# Patient Record
Sex: FEMALE | Race: WHITE | NOT HISPANIC OR LATINO | Employment: UNEMPLOYED | URBAN - METROPOLITAN AREA
[De-identification: names, ages, dates, MRNs, and addresses within clinical notes are randomized per-mention and may not be internally consistent; named-entity substitution may affect disease eponyms.]

---

## 2018-03-07 ENCOUNTER — HOSPITAL ENCOUNTER (OUTPATIENT)
Dept: PAIN MANAGEMENT | Age: 48
Discharge: HOME | End: 2018-03-07
Attending: PAIN MEDICINE

## 2018-03-07 VITALS
BODY MASS INDEX: 33.82 KG/M2 | RESPIRATION RATE: 16 BRPM | WEIGHT: 203 LBS | HEART RATE: 67 BPM | OXYGEN SATURATION: 98 % | HEIGHT: 65 IN | DIASTOLIC BLOOD PRESSURE: 86 MMHG | TEMPERATURE: 98.5 F | SYSTOLIC BLOOD PRESSURE: 147 MMHG

## 2018-03-07 DIAGNOSIS — M47.817 LUMBOSACRAL SPONDYLOSIS WITHOUT MYELOPATHY: Primary | ICD-10-CM

## 2018-03-07 PROCEDURE — 99202 OFFICE O/P NEW SF 15 MIN: CPT

## 2018-03-07 PROCEDURE — 99243 OFF/OP CNSLTJ NEW/EST LOW 30: CPT | Performed by: PAIN MEDICINE

## 2018-03-07 RX ORDER — TIZANIDINE 4 MG/1
4 TABLET ORAL 2 TIMES DAILY PRN
Qty: 60 TABLET | Refills: 3 | Status: SHIPPED | OUTPATIENT
Start: 2018-03-07 | End: 2018-06-12 | Stop reason: SINTOL

## 2018-03-07 RX ORDER — TRAMADOL HYDROCHLORIDE 50 MG/1
100 TABLET ORAL 2 TIMES DAILY
COMMUNITY
End: 2018-06-12 | Stop reason: ALTCHOICE

## 2018-03-07 ASSESSMENT — PAIN SCALES - GENERAL
PAIN_LEVEL_WITH_ACTIVITY: 9
PAIN_LEVEL_AT_REST: 5

## 2018-03-08 DIAGNOSIS — M47.817 LUMBOSACRAL SPONDYLOSIS WITHOUT MYELOPATHY: Primary | ICD-10-CM

## 2018-03-20 ENCOUNTER — APPOINTMENT (OUTPATIENT)
Dept: MRI IMAGING | Age: 48
End: 2018-03-20
Attending: PAIN MEDICINE

## 2018-04-17 ENCOUNTER — APPOINTMENT (OUTPATIENT)
Dept: MRI IMAGING | Age: 48
End: 2018-04-17
Attending: PAIN MEDICINE

## 2018-04-18 ENCOUNTER — TELEPHONE (OUTPATIENT)
Dept: PAIN MANAGEMENT | Age: 48
End: 2018-04-18

## 2018-05-07 ENCOUNTER — APPOINTMENT (OUTPATIENT)
Dept: MRI IMAGING | Age: 48
End: 2018-05-07
Attending: PAIN MEDICINE

## 2018-05-22 ENCOUNTER — TELEPHONE (OUTPATIENT)
Dept: PAIN MANAGEMENT | Age: 48
End: 2018-05-22

## 2018-05-22 DIAGNOSIS — M47.817 LUMBOSACRAL SPONDYLOSIS WITHOUT MYELOPATHY: Primary | ICD-10-CM

## 2018-06-12 ENCOUNTER — TELEPHONE (OUTPATIENT)
Dept: PREADMISSION TESTING | Age: 48
End: 2018-06-12

## 2018-06-12 DIAGNOSIS — R52 GENERALIZED PAIN: Primary | ICD-10-CM

## 2018-06-12 RX ORDER — SODIUM CHLORIDE 9 MG/ML
INJECTION, SOLUTION INTRAVENOUS CONTINUOUS
Status: CANCELLED | OUTPATIENT
Start: 2018-06-12

## 2018-06-12 RX ORDER — 0.9 % SODIUM CHLORIDE 0.9 %
2 VIAL (ML) INJECTION PRN
Status: CANCELLED | OUTPATIENT
Start: 2018-06-12

## 2018-06-12 RX ORDER — 0.9 % SODIUM CHLORIDE 0.9 %
2 VIAL (ML) INJECTION EVERY 12 HOURS SCHEDULED
Status: CANCELLED | OUTPATIENT
Start: 2018-06-13

## 2018-06-12 ASSESSMENT — COGNITIVE AND FUNCTIONAL STATUS - GENERAL
ARE YOU DEAF OR DO YOU HAVE SERIOUS DIFFICULTY  HEARING: NO
ARE YOU BLIND OR DO YOU HAVE SERIOUS DIFFICULTY SEEING, EVEN WHEN WEARING GLASSES: NO

## 2018-06-12 ASSESSMENT — ACTIVITIES OF DAILY LIVING (ADL)
CHRONIC_PAIN_PRESENT: YES, CHRONIC
SENSORY_SUPPORT_DEVICES: EYEGLASSES
ADL_SCORE: 12
HISTORY OF FALLING IN THE LAST YEAR (PRIOR TO ADMISSION): YES
ADL_SHORT_OF_BREATH: NO
ADL_BEFORE_ADMISSION: INDEPENDENT

## 2018-06-13 ENCOUNTER — HOSPITAL ENCOUNTER (OUTPATIENT)
Dept: GENERAL RADIOLOGY | Age: 48
Discharge: HOME | End: 2018-06-13
Attending: PAIN MEDICINE

## 2018-06-13 ENCOUNTER — HOSPITAL ENCOUNTER (OUTPATIENT)
Dept: PAIN MANAGEMENT | Age: 48
Discharge: HOME | End: 2018-06-13
Attending: PAIN MEDICINE

## 2018-06-13 VITALS
HEART RATE: 64 BPM | OXYGEN SATURATION: 94 % | DIASTOLIC BLOOD PRESSURE: 89 MMHG | RESPIRATION RATE: 16 BRPM | TEMPERATURE: 97.9 F | SYSTOLIC BLOOD PRESSURE: 143 MMHG | HEIGHT: 65 IN

## 2018-06-13 DIAGNOSIS — R52 GENERALIZED PAIN: ICD-10-CM

## 2018-06-13 PROCEDURE — 10004560 HB COUNTER-EXTENDED RECOVERY PER HOUR

## 2018-06-13 PROCEDURE — 10002800 HB RX 250 W HCPCS: Performed by: PAIN MEDICINE

## 2018-06-13 PROCEDURE — 10002807 HB RX 258: Performed by: PAIN MEDICINE

## 2018-06-13 PROCEDURE — 10005281 FL PACS RECORD NR

## 2018-06-13 PROCEDURE — 64635 DESTROY LUMB/SAC FACET JNT: CPT | Performed by: PAIN MEDICINE

## 2018-06-13 PROCEDURE — 10004348 HB COUNTER-EVAL PRE-OP

## 2018-06-13 PROCEDURE — 64636 DESTROY L/S FACET JNT ADDL: CPT | Performed by: PAIN MEDICINE

## 2018-06-13 PROCEDURE — 10004482 HB NEUROLYSIS FACET JOINT EA ADDL LEVEL

## 2018-06-13 PROCEDURE — 10004481 HB NEUROLYSIS FACET JOINT  W/IMAGING

## 2018-06-13 PROCEDURE — 99152 MOD SED SAME PHYS/QHP 5/>YRS: CPT

## 2018-06-13 PROCEDURE — 10002801 HB RX 250 W/O HCPCS: Performed by: PAIN MEDICINE

## 2018-06-13 RX ORDER — SODIUM CHLORIDE 9 MG/ML
INJECTION, SOLUTION INTRAVENOUS CONTINUOUS
Status: DISCONTINUED | OUTPATIENT
Start: 2018-06-13 | End: 2018-06-15 | Stop reason: HOSPADM

## 2018-06-13 RX ORDER — LIDOCAINE HYDROCHLORIDE 10 MG/ML
INJECTION, SOLUTION EPIDURAL; INFILTRATION; INTRACAUDAL; PERINEURAL PRN
Status: COMPLETED | OUTPATIENT
Start: 2018-06-13 | End: 2018-06-13

## 2018-06-13 RX ORDER — 0.9 % SODIUM CHLORIDE 0.9 %
2 VIAL (ML) INJECTION PRN
Status: DISCONTINUED | OUTPATIENT
Start: 2018-06-13 | End: 2018-06-15 | Stop reason: HOSPADM

## 2018-06-13 RX ORDER — BUPIVACAINE HYDROCHLORIDE 2.5 MG/ML
INJECTION, SOLUTION EPIDURAL; INFILTRATION; INTRACAUDAL PRN
Status: COMPLETED | OUTPATIENT
Start: 2018-06-13 | End: 2018-06-13

## 2018-06-13 RX ORDER — LIDOCAINE HYDROCHLORIDE 20 MG/ML
INJECTION, SOLUTION INFILTRATION; PERINEURAL PRN
Status: COMPLETED | OUTPATIENT
Start: 2018-06-13 | End: 2018-06-13

## 2018-06-13 RX ORDER — MIDAZOLAM HYDROCHLORIDE 1 MG/ML
INJECTION, SOLUTION INTRAMUSCULAR; INTRAVENOUS PRN
Status: COMPLETED | OUTPATIENT
Start: 2018-06-13 | End: 2018-06-13

## 2018-06-13 RX ORDER — 0.9 % SODIUM CHLORIDE 0.9 %
2 VIAL (ML) INJECTION EVERY 12 HOURS SCHEDULED
Status: DISCONTINUED | OUTPATIENT
Start: 2018-06-13 | End: 2018-06-15 | Stop reason: HOSPADM

## 2018-06-13 RX ORDER — METHYLPREDNISOLONE ACETATE 40 MG/ML
INJECTION, SUSPENSION INTRA-ARTICULAR; INTRALESIONAL; INTRAMUSCULAR; SOFT TISSUE PRN
Status: COMPLETED | OUTPATIENT
Start: 2018-06-13 | End: 2018-06-13

## 2018-06-13 RX ADMIN — FENTANYL CITRATE 25 MCG: 50 INJECTION INTRAMUSCULAR; INTRAVENOUS at 10:17

## 2018-06-13 RX ADMIN — MIDAZOLAM HYDROCHLORIDE 1 MG: 1 INJECTION, SOLUTION INTRAMUSCULAR; INTRAVENOUS at 10:26

## 2018-06-13 RX ADMIN — FENTANYL CITRATE 25 MCG: 50 INJECTION INTRAMUSCULAR; INTRAVENOUS at 10:15

## 2018-06-13 RX ADMIN — MIDAZOLAM HYDROCHLORIDE 1 MG: 1 INJECTION, SOLUTION INTRAMUSCULAR; INTRAVENOUS at 10:09

## 2018-06-13 RX ADMIN — LIDOCAINE HYDROCHLORIDE 1 ML: 20 INJECTION, SOLUTION INFILTRATION; PERINEURAL at 10:14

## 2018-06-13 RX ADMIN — MIDAZOLAM HYDROCHLORIDE 1 MG: 1 INJECTION, SOLUTION INTRAMUSCULAR; INTRAVENOUS at 10:19

## 2018-06-13 RX ADMIN — BUPIVACAINE HYDROCHLORIDE 3 ML: 2.5 INJECTION, SOLUTION EPIDURAL; INFILTRATION; INTRACAUDAL at 10:13

## 2018-06-13 RX ADMIN — MIDAZOLAM HYDROCHLORIDE 1 MG: 1 INJECTION, SOLUTION INTRAMUSCULAR; INTRAVENOUS at 10:22

## 2018-06-13 RX ADMIN — LIDOCAINE HYDROCHLORIDE 5 ML: 10 INJECTION, SOLUTION EPIDURAL; INFILTRATION; INTRACAUDAL; PERINEURAL at 10:13

## 2018-06-13 RX ADMIN — FENTANYL CITRATE 25 MCG: 50 INJECTION INTRAMUSCULAR; INTRAVENOUS at 10:13

## 2018-06-13 RX ADMIN — SODIUM CHLORIDE: 9 INJECTION, SOLUTION INTRAVENOUS at 09:49

## 2018-06-13 RX ADMIN — METHYLPREDNISOLONE ACETATE 40 MG: 40 INJECTION, SUSPENSION INTRA-ARTICULAR; INTRALESIONAL; INTRAMUSCULAR; SOFT TISSUE at 10:14

## 2018-06-13 RX ADMIN — MIDAZOLAM HYDROCHLORIDE 1 MG: 1 INJECTION, SOLUTION INTRAMUSCULAR; INTRAVENOUS at 10:13

## 2018-06-13 RX ADMIN — FENTANYL CITRATE 25 MCG: 50 INJECTION INTRAMUSCULAR; INTRAVENOUS at 10:09

## 2018-06-13 ASSESSMENT — PAIN SCALES - GENERAL
PAINLEVEL_OUTOF10: 6
PAIN_LEVEL_AT_REST: 2
PAIN_LEVEL_AT_REST: 6
PAIN_LEVEL_AT_REST: 2

## 2018-06-13 ASSESSMENT — LIFESTYLE VARIABLES: SMOKING_HISTORY: NO

## 2018-06-18 ENCOUNTER — TELEPHONE (OUTPATIENT)
Dept: PAIN MANAGEMENT | Age: 48
End: 2018-06-18

## 2022-09-13 ENCOUNTER — TELEPHONE (OUTPATIENT)
Dept: FAMILY MEDICINE CLINIC | Facility: CLINIC | Age: 52
End: 2022-09-13

## 2022-10-13 ENCOUNTER — OFFICE VISIT (OUTPATIENT)
Dept: FAMILY MEDICINE CLINIC | Facility: CLINIC | Age: 52
End: 2022-10-13

## 2022-10-13 ENCOUNTER — PATIENT ROUNDING (BHMG ONLY) (OUTPATIENT)
Dept: FAMILY MEDICINE CLINIC | Facility: CLINIC | Age: 52
End: 2022-10-13

## 2022-10-13 VITALS
BODY MASS INDEX: 38.1 KG/M2 | TEMPERATURE: 97.8 F | WEIGHT: 223.2 LBS | DIASTOLIC BLOOD PRESSURE: 82 MMHG | SYSTOLIC BLOOD PRESSURE: 123 MMHG | OXYGEN SATURATION: 96 % | HEIGHT: 64 IN | HEART RATE: 100 BPM | RESPIRATION RATE: 19 BRPM

## 2022-10-13 DIAGNOSIS — R73.03 PREDIABETES: ICD-10-CM

## 2022-10-13 DIAGNOSIS — E78.2 MIXED HYPERLIPIDEMIA: ICD-10-CM

## 2022-10-13 DIAGNOSIS — R06.81 APNEA: ICD-10-CM

## 2022-10-13 DIAGNOSIS — G56.03 BILATERAL CARPAL TUNNEL SYNDROME: ICD-10-CM

## 2022-10-13 DIAGNOSIS — R63.2 BINGE EATING: ICD-10-CM

## 2022-10-13 DIAGNOSIS — Z00.00 ANNUAL PHYSICAL EXAM: ICD-10-CM

## 2022-10-13 DIAGNOSIS — H91.93 BILATERAL HEARING LOSS, UNSPECIFIED HEARING LOSS TYPE: ICD-10-CM

## 2022-10-13 DIAGNOSIS — Z12.11 COLON CANCER SCREENING: ICD-10-CM

## 2022-10-13 DIAGNOSIS — I10 PRIMARY HYPERTENSION: ICD-10-CM

## 2022-10-13 DIAGNOSIS — H93.13 TINNITUS OF BOTH EARS: ICD-10-CM

## 2022-10-13 DIAGNOSIS — F41.9 ANXIETY AND DEPRESSION: ICD-10-CM

## 2022-10-13 DIAGNOSIS — Z76.89 ENCOUNTER TO ESTABLISH CARE: Primary | ICD-10-CM

## 2022-10-13 DIAGNOSIS — F32.A ANXIETY AND DEPRESSION: ICD-10-CM

## 2022-10-13 DIAGNOSIS — G89.29 CHRONIC BILATERAL LOW BACK PAIN WITHOUT SCIATICA: ICD-10-CM

## 2022-10-13 DIAGNOSIS — Z12.31 ENCOUNTER FOR SCREENING MAMMOGRAM FOR MALIGNANT NEOPLASM OF BREAST: ICD-10-CM

## 2022-10-13 DIAGNOSIS — M54.50 CHRONIC BILATERAL LOW BACK PAIN WITHOUT SCIATICA: ICD-10-CM

## 2022-10-13 DIAGNOSIS — D17.30 LIPOMA OF SKIN: ICD-10-CM

## 2022-10-13 DIAGNOSIS — E88.81 METABOLIC SYNDROME: ICD-10-CM

## 2022-10-13 DIAGNOSIS — M47.818 SI JOINT ARTHRITIS: ICD-10-CM

## 2022-10-13 PROCEDURE — 99204 OFFICE O/P NEW MOD 45 MIN: CPT | Performed by: STUDENT IN AN ORGANIZED HEALTH CARE EDUCATION/TRAINING PROGRAM

## 2022-10-13 RX ORDER — DULOXETIN HYDROCHLORIDE 30 MG/1
30 CAPSULE, DELAYED RELEASE ORAL DAILY
Qty: 90 CAPSULE | Refills: 1 | Status: SHIPPED | OUTPATIENT
Start: 2022-10-13 | End: 2023-04-05 | Stop reason: SDUPTHER

## 2022-10-13 RX ORDER — LOSARTAN POTASSIUM AND HYDROCHLOROTHIAZIDE 12.5; 5 MG/1; MG/1
1 TABLET ORAL DAILY
COMMUNITY
Start: 2022-10-04 | End: 2022-10-13 | Stop reason: SDUPTHER

## 2022-10-13 RX ORDER — LOSARTAN POTASSIUM AND HYDROCHLOROTHIAZIDE 12.5; 5 MG/1; MG/1
1 TABLET ORAL DAILY
Qty: 90 TABLET | Refills: 1 | Status: SHIPPED | OUTPATIENT
Start: 2022-10-13

## 2022-10-13 NOTE — PROGRESS NOTES
"Chief Complaint  Establishing care with new physician for management of prediabetes/hypertension/hyperlipidemia and multiple other concerns    Subjective         Tiffanie Peres is a 51 y.o. female who presents to [facility]    51 years old female comes to the clinic today to establish care, follow-up, medication management and to discuss multiple concerns.    Recently moved to [place].  Patient has prediabetes and hyperlipidemia; currently not taking any medication and does not want to go on any medication.    Hypertension is controlled on Hyzaar.    Patient reports binge eating and mild history of anxiety/depression and would like to try something to help with the symptoms.    Patient was following up with orthopedic for SI joint arthritis and bursitis, had ablation done in the past and would like to see orthopedic again.  Patient also has bilateral carpal tunnel, was supposed to have surgery in the past but due to COVID it was postponed and now would like to follow-up with orthopedic for that as well.    Patient has multiple lipomas on skin, patient had multiple surgical interventions to remove lipoma in the past.  Currently reports 1 big lipoma at right thigh and would like to see surgery.    Patient also reports mild hearing impairment with tinnitus    Want to do sleep study due to snoring and possible apneic episodes in sleep.    Denies any chest pain or shortness of breath.    Physically active  Objective   Vital Signs:   Vitals:    10/13/22 0837   BP: 123/82   Pulse: 100   Resp: 19   Temp: 97.8 °F (36.6 °C)   SpO2: 96%   Weight: 101 kg (223 lb 3.2 oz)   Height: 162.6 cm (64\")      Body mass index is 38.31 kg/m².   Wt Readings from Last 3 Encounters:   10/13/22 101 kg (223 lb 3.2 oz)      BP Readings from Last 3 Encounters:   10/13/22 123/82        Patient Care Team:  Zo Vance MD as PCP - General (Family Medicine)     Physical Exam  Vitals reviewed.   Constitutional:  "      Appearance: Normal appearance. She is well-developed.   HENT:      Head: Normocephalic and atraumatic.      Right Ear: External ear normal.      Left Ear: External ear normal.      Mouth/Throat:      Pharynx: No oropharyngeal exudate.   Eyes:      Conjunctiva/sclera: Conjunctivae normal.      Pupils: Pupils are equal, round, and reactive to light.   Cardiovascular:      Rate and Rhythm: Normal rate and regular rhythm.      Heart sounds: No murmur heard.    No friction rub. No gallop.   Pulmonary:      Effort: Pulmonary effort is normal.      Breath sounds: Normal breath sounds. No wheezing or rhonchi.   Abdominal:      General: Bowel sounds are normal. There is no distension.      Palpations: Abdomen is soft.      Tenderness: There is no abdominal tenderness.   Skin:     General: Skin is warm and dry.   Neurological:      Mental Status: She is alert and oriented to person, place, and time.      Cranial Nerves: No cranial nerve deficit.   Psychiatric:         Mood and Affect: Mood and affect normal.         Behavior: Behavior normal.         Thought Content: Thought content normal.         Judgment: Judgment normal.                       Assessment and Plan   Diagnoses and all orders for this visit:    1. Encounter to establish care (Primary)  -     TSH Rfx On Abnormal To Free T4; Future  -     CBC & Differential; Future  -     Comprehensive Metabolic Panel; Future  -     Hemoglobin A1c; Future  -     Lipid Panel; Future    2. Mixed hyperlipidemia  Comments:  Lifestyle modifications discussed, statin offered but declined.  Recent blood work from 2 months ago reviewed/will scanned to the chart, done at outside facilit  Orders:  -     TSH Rfx On Abnormal To Free T4; Future  -     CBC & Differential; Future  -     Comprehensive Metabolic Panel; Future  -     Hemoglobin A1c; Future  -     Lipid Panel; Future    3. Prediabetes  Comments:  Lifestyle modifications discussed, metformin offered but declined.  Orders:  -      TSH Rfx On Abnormal To Free T4; Future  -     CBC & Differential; Future  -     Comprehensive Metabolic Panel; Future  -     Hemoglobin A1c; Future  -     Lipid Panel; Future    4. Primary hypertension  Comments:  Chronic, controlled on Hyzaar.  DASH diet discussed  Orders:  -     losartan-hydrochlorothiazide (HYZAAR) 50-12.5 MG per tablet; Take 1 tablet by mouth Daily.  Dispense: 90 tablet; Refill: 1  -     TSH Rfx On Abnormal To Free T4; Future  -     CBC & Differential; Future  -     Comprehensive Metabolic Panel; Future  -     Hemoglobin A1c; Future  -     Lipid Panel; Future    5. Binge eating  -     DULoxetine (CYMBALTA) 30 MG capsule; Take 1 capsule by mouth Daily.  Dispense: 90 capsule; Refill: 1  -     TSH Rfx On Abnormal To Free T4; Future  -     CBC & Differential; Future  -     Comprehensive Metabolic Panel; Future  -     Hemoglobin A1c; Future  -     Lipid Panel; Future    6. Metabolic syndrome  -     TSH Rfx On Abnormal To Free T4; Future  -     CBC & Differential; Future  -     Comprehensive Metabolic Panel; Future  -     Hemoglobin A1c; Future  -     Lipid Panel; Future    7. Annual physical exam  -     TSH Rfx On Abnormal To Free T4; Future  -     CBC & Differential; Future  -     Comprehensive Metabolic Panel; Future  -     Hemoglobin A1c; Future  -     Lipid Panel; Future    8. Chronic bilateral low back pain without sciatica  Comments:  Will consult orthopedic and further interventions if needed  Orders:  -     DULoxetine (CYMBALTA) 30 MG capsule; Take 1 capsule by mouth Daily.  Dispense: 90 capsule; Refill: 1  -     TSH Rfx On Abnormal To Free T4; Future  -     CBC & Differential; Future  -     Comprehensive Metabolic Panel; Future  -     Hemoglobin A1c; Future  -     Lipid Panel; Future    9. Anxiety and depression  Comments:  We will start patient on Cymbalta  Orders:  -     DULoxetine (CYMBALTA) 30 MG capsule; Take 1 capsule by mouth Daily.  Dispense: 90 capsule; Refill: 1  -     TSH Rfx  On Abnormal To Free T4; Future  -     CBC & Differential; Future  -     Comprehensive Metabolic Panel; Future  -     Hemoglobin A1c; Future  -     Lipid Panel; Future    10. Encounter for screening mammogram for malignant neoplasm of breast  -     Mammo Screening Bilateral With CAD; Future  -     TSH Rfx On Abnormal To Free T4; Future  -     CBC & Differential; Future  -     Comprehensive Metabolic Panel; Future  -     Hemoglobin A1c; Future  -     Lipid Panel; Future    11. Colon cancer screening  -     Ambulatory Referral For Screening Colonoscopy  -     TSH Rfx On Abnormal To Free T4; Future  -     CBC & Differential; Future  -     Comprehensive Metabolic Panel; Future  -     Hemoglobin A1c; Future  -     Lipid Panel; Future    12. SI joint arthritis  Comments:  Patient may need further interventions by orthopedic  Orders:  -     Ambulatory Referral to Orthopedic Surgery  -     TSH Rfx On Abnormal To Free T4; Future  -     CBC & Differential; Future  -     Comprehensive Metabolic Panel; Future  -     Hemoglobin A1c; Future  -     Lipid Panel; Future    13. Bilateral carpal tunnel syndrome  -     Ambulatory Referral to Orthopedic Surgery  -     TSH Rfx On Abnormal To Free T4; Future  -     CBC & Differential; Future  -     Comprehensive Metabolic Panel; Future  -     Hemoglobin A1c; Future  -     Lipid Panel; Future    14. multiple Lipomas of skin  -     Ambulatory Referral to General Surgery  -     TSH Rfx On Abnormal To Free T4; Future  -     CBC & Differential; Future  -     Comprehensive Metabolic Panel; Future  -     Hemoglobin A1c; Future  -     Lipid Panel; Future    15. Tinnitus of both ears  -     Ambulatory Referral to ENT (Otolaryngology)    16. Bilateral hearing loss, unspecified hearing loss type  -     Ambulatory Referral to ENT (Otolaryngology)    17. Apnea  -     Ambulatory Referral to Sleep Medicine      Recent blood work reviewed, was [place] in another state by another provider.  We will scan  records.    Tobacco Use: Medium Risk   • Smoking Tobacco Use: Former   • Smokeless Tobacco Use: Never   • Passive Exposure: Not on file            Follow Up   Return in about 4 weeks (around 11/10/2022) for PAP and Annual physical .  Patient was given instructions and counseling regarding her condition or for health maintenance advice. Please see specific information pulled into the AVS if appropriate.

## 2022-10-13 NOTE — PROGRESS NOTES
October 13, 2022    Hello, may I speak with Tiffanie Peres?    My name is Cary       I am  with [facility]  [facility]  [address]   [place] [address] [phone].    Before we get started may I verify your date of birth? 1970    I am calling to officially welcome you to our practice and ask about your recent visit. Is this a good time to talk? yes    Tell me about your visit with us. What things went well?  Really likes staff and doctor       We're always looking for ways to make our patients' experiences even better. Do you have recommendations on ways we may improve?  no    Overall were you satisfied with your first visit to our practice? yes       I appreciate you taking the time to speak with me today. Is there anything else I can do for you? no      Thank you, and have a great day.

## 2022-10-18 DIAGNOSIS — Z12.31 ENCOUNTER FOR SCREENING MAMMOGRAM FOR MALIGNANT NEOPLASM OF BREAST: Primary | ICD-10-CM

## 2022-10-26 ENCOUNTER — PREP FOR SURGERY (OUTPATIENT)
Dept: OTHER | Facility: HOSPITAL | Age: 52
End: 2022-10-26

## 2022-10-26 ENCOUNTER — OFFICE VISIT (OUTPATIENT)
Dept: SURGERY | Facility: CLINIC | Age: 52
End: 2022-10-26

## 2022-10-26 VITALS — BODY MASS INDEX: 38.38 KG/M2 | WEIGHT: 224.8 LBS | HEIGHT: 64 IN

## 2022-10-26 DIAGNOSIS — Z13.9 SCREENING DUE: Primary | ICD-10-CM

## 2022-10-26 DIAGNOSIS — D17.9 MULTIPLE LIPOMAS: ICD-10-CM

## 2022-10-26 DIAGNOSIS — D17.9 MULTIPLE LIPOMAS: Primary | ICD-10-CM

## 2022-10-26 PROCEDURE — 99203 OFFICE O/P NEW LOW 30 MIN: CPT | Performed by: SURGERY

## 2022-10-26 RX ORDER — CARBOXYMETHYLCELLULOSE SODIUM 0.5 G/100ML
2 SOLUTION/ DROPS OPHTHALMIC DAILY PRN
COMMUNITY
Start: 2022-10-18 | End: 2023-01-27

## 2022-10-26 RX ORDER — SODIUM CHLORIDE 0.9 % (FLUSH) 0.9 %
10 SYRINGE (ML) INJECTION AS NEEDED
Status: CANCELLED | OUTPATIENT
Start: 2022-10-26

## 2022-10-26 RX ORDER — SODIUM CHLORIDE 0.9 % (FLUSH) 0.9 %
10 SYRINGE (ML) INJECTION EVERY 12 HOURS SCHEDULED
Status: CANCELLED | OUTPATIENT
Start: 2022-10-26

## 2022-10-26 RX ORDER — SODIUM CHLORIDE 9 MG/ML
40 INJECTION, SOLUTION INTRAVENOUS AS NEEDED
Status: CANCELLED | OUTPATIENT
Start: 2022-10-26

## 2022-10-26 RX ORDER — ERYTHROMYCIN 5 MG/G
OINTMENT OPHTHALMIC
COMMUNITY
Start: 2022-10-18 | End: 2022-11-10

## 2022-10-26 RX ORDER — CEFAZOLIN SODIUM 2 G/100ML
2 INJECTION, SOLUTION INTRAVENOUS ONCE
Status: CANCELLED | OUTPATIENT
Start: 2022-10-26 | End: 2022-10-26

## 2022-10-26 NOTE — PROGRESS NOTES
Chief Complaint: Colonoscopy (Consult. Patient states she has not had a colonoscopy before. Multiple Lipomas on arms and legs.)    Subjective     {Problem List  Visit Diagnosis   Encounters  Notes  Medications  Labs  Result Review Imaging  Media :23}    History of Present Illness  Colon cancer screening  The patient denies previously having a colonoscopy. She denies any blood in her stool, narrow stools, thin stools, unexplained weight loss, or night sweats. She denies any family history of colon cancer, Crohn's disease, ulcerative colitis, or Hubbard syndrome. The patient reports that she does have hemorrhoids.    Lipoma  She has a history of lipomas. The patient reports that she has had approximately 16 lipomas removed previously. She notes that she had 5 or 6 lipomas removed in 2008 and 10 lipomas removed in 2013. The patient states that she has approximately 100 currently and a lot of them are causing pain. She mentions that she has gained 70 pounds since 2013 and was trying to wait until she lost weight before getting them removed. The patient notes that sitting on the toilet seat is painful. She states that some of her arms have appeared in the last 6 months and they hurt all the time. She reports that any movement, such as doing yoga, intercourse, laying down, and resting her arms down have caused them to hurt.    Tiffanie Peres is a 52 y.o. female presents to [facility]. The patient is to be seen for colonoscopy screening and multiple lipomas.    Objective     Past Medical History:   Diagnosis Date   • Anxiety 1988   • Arthritis 2010   • Bleeding disorder (HCC) 10/31/2001   • Deep vein thrombosis (HCC) 10/31/2001   • Depression 1988   • Eating disorder    • History of medical problems     Factor v lieden   • HL (hearing loss) 2017   • Hypertension    • Low back pain 2009   • Obesity    • Pulmonary embolism (HCC) 10/31/2001   • Visual impairment        Past Surgical  History:   Procedure Laterality Date   • BREAST BIOPSY      Right side   • SUBTOTAL HYSTERECTOMY      Still have cervix   • TUBAL ABDOMINAL LIGATION           Current Outpatient Medications:   •  cholecalciferol (VITAMIN D3) 1.25 MG (81660 UT) capsule, cholecalciferol (vitamin D3) 1,250 mcg (50,000 unit) capsule  take one capsule by mouth once weekly, Disp: , Rfl:   •  conjugated estrogens in sodium chloride 0.9 % 50 mL IVPB, Infuse  into a venous catheter., Disp: , Rfl:   •  DULoxetine (CYMBALTA) 30 MG capsule, Take 1 capsule by mouth Daily., Disp: 90 capsule, Rfl: 1  •  erythromycin (ROMYCIN) 5 MG/GM ophthalmic ointment, , Disp: , Rfl:   •  IODINE-VITAMIN A PO, iodine, Disp: , Rfl:   •  losartan-hydrochlorothiazide (HYZAAR) 50-12.5 MG per tablet, Take 1 tablet by mouth Daily., Disp: 90 tablet, Rfl: 1  •  Lubricating Plus Eye Drops 0.5 % solution, , Disp: , Rfl:     Allergies   Allergen Reactions   • Naproxen Rash        Family History   Problem Relation Age of Onset   • Anxiety disorder Mother    • Arthritis Mother    • Depression Mother    • Heart disease Mother    • Hyperlipidemia Mother    • Heart disease Father    • Hyperlipidemia Father    • Arthritis Maternal Grandmother    • Cancer Maternal Grandmother    • Diabetes Maternal Grandmother    • Vision loss Maternal Grandmother    • Alcohol abuse Brother    • Drug abuse Brother    • Anxiety disorder Son    • Anxiety disorder Sister    • Depression Sister        Social History     Socioeconomic History   • Marital status:    Tobacco Use   • Smoking status: Former     Packs/day: 1.00     Years: 18.00     Pack years: 18.00     Types: Cigarettes     Quit date: 10/20/2001     Years since quittin.0   • Smokeless tobacco: Never   Vaping Use   • Vaping Use: Never used   Substance and Sexual Activity   • Alcohol use: Yes     Comment: One beer every few months   • Sexual activity: Yes     Partners: Male     Birth control/protection: Same-sex  "partner       Vital Signs:   Ht 162.6 cm (64\")   Wt 102 kg (224 lb 12.8 oz)   BMI 38.59 kg/m²      Physical Exam  Vitals and nursing note reviewed.   Constitutional:       General: She is not in acute distress.     Appearance: Normal appearance. She is well-developed and normal weight.   Cardiovascular:      Rate and Rhythm: Normal rate and regular rhythm.   Pulmonary:      Effort: Pulmonary effort is normal.      Breath sounds: Normal air entry.   Abdominal:      General: Bowel sounds are normal.      Palpations: Abdomen is soft.   Skin:     General: Skin is warm and dry.      Comments: On her arms, legs, chest, posterior thighs, she has multiple small lipomas that they range from 1 cm to 5 cm. They are tender. There is no overlying skin changes.   Neurological:      Mental Status: She is alert and oriented to person, place, and time.      Motor: Motor function is intact.   Psychiatric:         Mood and Affect: Mood normal.          Result Review :            Procedures        Assessment and Plan    There are no diagnoses linked to this encounter.       Colon cancer screening and Lipomas  -The patient is in need of screening colonoscopy. She has never had a colonoscopy before. She is not currently having any symptoms. She does not have any family history and patient has multiple lipomas. I discussed with the patient we will probably try and do 10 to 15 at one time. We will do this in the operating room. Risks, benefits, alternatives of the procedure were discussed extensively. All questions were answered. Patient voiced understanding and agreed to proceed with the above plan.  Follow Up   No follow-ups on file.  Patient was given instructions and counseling regarding her condition or for health maintenance advice. Please see specific information pulled into the AVS if appropriate.       Transcribed from ambient dictation for David Perry MD by Lulu Wells.  10/26/22   15:12 EDT    Patient or patient " representative verbalized consent to the visit recording.  I have personally performed the services described in this document as transcribed by the above individual, and it is both accurate and complete.

## 2022-10-27 ENCOUNTER — PREP FOR SURGERY (OUTPATIENT)
Dept: OTHER | Facility: HOSPITAL | Age: 52
End: 2022-10-27

## 2022-10-27 ENCOUNTER — OFFICE VISIT (OUTPATIENT)
Dept: ORTHOPEDIC SURGERY | Facility: CLINIC | Age: 52
End: 2022-10-27

## 2022-10-27 VITALS — OXYGEN SATURATION: 97 % | BODY MASS INDEX: 38.24 KG/M2 | HEART RATE: 75 BPM | HEIGHT: 64 IN | WEIGHT: 224 LBS

## 2022-10-27 DIAGNOSIS — M25.551 RIGHT HIP PAIN: Primary | ICD-10-CM

## 2022-10-27 DIAGNOSIS — G56.03 CARPAL TUNNEL SYNDROME ON BOTH SIDES: ICD-10-CM

## 2022-10-27 DIAGNOSIS — G56.03 CARPAL TUNNEL SYNDROME ON BOTH SIDES: Primary | ICD-10-CM

## 2022-10-27 DIAGNOSIS — M16.11 ARTHRITIS OF RIGHT HIP: ICD-10-CM

## 2022-10-27 PROCEDURE — 99203 OFFICE O/P NEW LOW 30 MIN: CPT | Performed by: ORTHOPAEDIC SURGERY

## 2022-10-27 RX ORDER — CEFAZOLIN SODIUM IN 0.9 % NACL 3 G/100 ML
3 INTRAVENOUS SOLUTION, PIGGYBACK (ML) INTRAVENOUS ONCE
Status: CANCELLED | OUTPATIENT
Start: 2022-10-27 | End: 2022-10-27

## 2022-10-27 RX ORDER — CEFAZOLIN SODIUM 2 G/100ML
2 INJECTION, SOLUTION INTRAVENOUS ONCE
Status: CANCELLED | OUTPATIENT
Start: 2022-10-27 | End: 2022-10-27

## 2022-10-27 NOTE — PROGRESS NOTES
"Chief Complaint  Pain and Initial Evaluation of the Right Hip, Initial Evaluation and Pain of the Left Hand, and Initial Evaluation and Pain of the Right Hand     Subjective      Tiffanie Peres presents to [facility] for right hip. Patient has a history of bilateral carpal tunnel, in  EMG was done and stated carpal tunnel was on the severe end.  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 Patient has a history of SI joint issues.   she did steroid injection to right hip that gave some relief.     Allergies   Allergen Reactions   • Naproxen Rash        Social History     Socioeconomic History   • Marital status:    Tobacco Use   • Smoking status: Former     Packs/day: 1.00     Years: 18.00     Pack years: 18.00     Types: Cigarettes     Quit date: 10/20/2001     Years since quittin.0   • Smokeless tobacco: Never   Vaping Use   • Vaping Use: Never used   Substance and Sexual Activity   • Alcohol use: Yes     Comment: One beer every few months   • Sexual activity: Yes     Partners: Male     Birth control/protection: Same-sex partner        Review of Systems     Objective   Vital Signs:   Pulse 75   Ht 162 cm (63.78\")   Wt 102 kg (224 lb)   SpO2 97%   BMI 38.72 kg/m²       Physical Exam  Constitutional:       Appearance: Normal appearance. Patient is well-developed and normal weight.   HENT:      Head: Normocephalic.      Right Ear: Hearing and external ear normal.      Left Ear: Hearing and external ear normal.      Nose: Nose normal.   Eyes:      Conjunctiva/sclera: Conjunctivae normal.   Cardiovascular:      Rate and Rhythm: Normal rate.   Pulmonary:      Effort: Pulmonary effort is normal.      Breath sounds: No wheezing or rales.   Abdominal:      Palpations: Abdomen is soft.      Tenderness: There is no abdominal tenderness.   Musculoskeletal:      Cervical back: Normal range of motion.   Skin:     Findings: No " rash.   Neurological:      Mental Status: Patient is alert and oriented to person, place, and time.   Psychiatric:         Mood and Affect: Mood and affect normal.         Judgment: Judgment normal.       Ortho Exam      RIGHT HIP Full passive hip range of motion. No swelling. No atrophy. Calf soft. Non-tender hip and pelvic muscles. Good tone of hip flexors. Tender lateral hip and SI joint.     BILATERAL WRISTS Sensation intact. Neurovascular Intact. Intact finger ROM. Full , thumb opposition, MCP flexors, DIP flexors and PIP flexors. Numbness and tingling to B hands.  Positive Tinel's and Phalen's.        Procedures      Imaging Results (Most Recent)     Procedure Component Value Units Date/Time    XR Hip With or Without Pelvis 2 - 3 View Right [[id number]] Resulted: 10/27/22 0808     Updated: 10/27/22 0814           Result Review :     X-Ray Report:  Right hip(s) X-Ray  Indication: Evaluation of right hip.   AP/Lateral view(s)  Findings: No fracture. No significant arthrosis noted. Arthritis noted at the SI joint.   Prior studies available for comparison: No        Assessment and Plan     Diagnoses and all orders for this visit:    1. Right hip pain (Primary)  -     XR Hip With or Without Pelvis 2 - 3 View Right    2. Carpal tunnel syndrome on both sides    3. Arthritis of right hip SI         Discussed setting up patient with physician at pain management of right hip.  Refer to PT for therapy for SI pain.  Do not feel mandatory to get nerve test done.  Discussed bracing which she has done with no effect.  Patient is ready for surgery and right hand is worse.  Discussed risk and benefits of carpal tunnel release, she wishes to proceed with the right.       Discussed surgery., Risks/benefits discussed with patient including, but not limited to: infection, bleeding, neurovascular damage, re-rupture, aesthetic deformity, need for further surgery, and death. and Surgery pamphlet given.    Follow Up     Post  Operatively      Patient was given instructions and counseling regarding her condition or for health maintenance advice. Please see specific information pulled into the AVS if appropriate.     Scribed for Dereje Tolbert MD by Kim Monroe MA.  10/27/22   08:06 EDT    I have personally performed the services described in this document as scribed by the above individual and it is both accurate and complete. Dereje Tolbert MD 10/27/22

## 2022-10-28 DIAGNOSIS — M53.3 SACRO ILIAL PAIN: ICD-10-CM

## 2022-10-28 DIAGNOSIS — M16.11 ARTHRITIS OF RIGHT HIP: Primary | ICD-10-CM

## 2022-11-10 ENCOUNTER — OFFICE VISIT (OUTPATIENT)
Dept: FAMILY MEDICINE CLINIC | Facility: CLINIC | Age: 52
End: 2022-11-10

## 2022-11-10 VITALS
TEMPERATURE: 97.8 F | SYSTOLIC BLOOD PRESSURE: 128 MMHG | BODY MASS INDEX: 38.84 KG/M2 | WEIGHT: 227.5 LBS | DIASTOLIC BLOOD PRESSURE: 82 MMHG | OXYGEN SATURATION: 97 % | HEIGHT: 64 IN | RESPIRATION RATE: 19 BRPM | HEART RATE: 74 BPM

## 2022-11-10 DIAGNOSIS — Z00.00 ANNUAL PHYSICAL EXAM: Primary | ICD-10-CM

## 2022-11-10 DIAGNOSIS — Z12.4 CERVICAL CANCER SCREENING: ICD-10-CM

## 2022-11-10 DIAGNOSIS — Z23 NEED FOR TDAP VACCINATION: ICD-10-CM

## 2022-11-10 DIAGNOSIS — N63.14 MASS OF LOWER INNER QUADRANT OF RIGHT BREAST: ICD-10-CM

## 2022-11-10 LAB
C TRACH RRNA CVX QL NAA+PROBE: NOT DETECTED
CANDIDA SPECIES: NEGATIVE
GARDNERELLA VAGINALIS: NEGATIVE
N GONORRHOEA RRNA SPEC QL NAA+PROBE: NOT DETECTED
T VAGINALIS DNA VAG QL PROBE+SIG AMP: NEGATIVE

## 2022-11-10 PROCEDURE — 99396 PREV VISIT EST AGE 40-64: CPT | Performed by: STUDENT IN AN ORGANIZED HEALTH CARE EDUCATION/TRAINING PROGRAM

## 2022-11-10 PROCEDURE — 90471 IMMUNIZATION ADMIN: CPT | Performed by: STUDENT IN AN ORGANIZED HEALTH CARE EDUCATION/TRAINING PROGRAM

## 2022-11-10 PROCEDURE — 87624 HPV HI-RISK TYP POOLED RSLT: CPT | Performed by: STUDENT IN AN ORGANIZED HEALTH CARE EDUCATION/TRAINING PROGRAM

## 2022-11-10 PROCEDURE — 87660 TRICHOMONAS VAGIN DIR PROBE: CPT | Performed by: STUDENT IN AN ORGANIZED HEALTH CARE EDUCATION/TRAINING PROGRAM

## 2022-11-10 PROCEDURE — G0123 SCREEN CERV/VAG THIN LAYER: HCPCS | Performed by: STUDENT IN AN ORGANIZED HEALTH CARE EDUCATION/TRAINING PROGRAM

## 2022-11-10 PROCEDURE — 87491 CHLMYD TRACH DNA AMP PROBE: CPT | Performed by: STUDENT IN AN ORGANIZED HEALTH CARE EDUCATION/TRAINING PROGRAM

## 2022-11-10 PROCEDURE — 87591 N.GONORRHOEAE DNA AMP PROB: CPT | Performed by: STUDENT IN AN ORGANIZED HEALTH CARE EDUCATION/TRAINING PROGRAM

## 2022-11-10 PROCEDURE — 90715 TDAP VACCINE 7 YRS/> IM: CPT | Performed by: STUDENT IN AN ORGANIZED HEALTH CARE EDUCATION/TRAINING PROGRAM

## 2022-11-10 PROCEDURE — 87480 CANDIDA DNA DIR PROBE: CPT | Performed by: STUDENT IN AN ORGANIZED HEALTH CARE EDUCATION/TRAINING PROGRAM

## 2022-11-10 PROCEDURE — 87510 GARDNER VAG DNA DIR PROBE: CPT | Performed by: STUDENT IN AN ORGANIZED HEALTH CARE EDUCATION/TRAINING PROGRAM

## 2022-11-10 RX ORDER — MULTIPLE VITAMINS W/ MINERALS TAB 9MG-400MCG
1 TAB ORAL DAILY
COMMUNITY

## 2022-11-10 NOTE — PROGRESS NOTES
"Chief Complaint  Annual physical and Pap smear    Subjective         Tiffanie Peres is a 52 y.o. female who presents to [facility]    52 years old comes to the clinic today for annual physical and Pap smear.    Last Pap smear was 5 years ago, no history of abnormal Pap smear.  Does have genital warts but denies any other STD/STI.    Last mammogram was abnormal, had biopsy done which was normal in the past.  Patient already has scheduled screening mammogram in February.    Denies any chest pain or shortness of breath, denies any  symptoms.        Objective   Vital Signs:   Vitals:    11/10/22 0705   BP: 128/82   Pulse: 74   Resp: 19   Temp: 97.8 °F (36.6 °C)   SpO2: 97%   Weight: 103 kg (227 lb 8 oz)   Height: 162 cm (63.78\")      Body mass index is 39.32 kg/m².   Wt Readings from Last 3 Encounters:   11/10/22 103 kg (227 lb 8 oz)   10/27/22 102 kg (224 lb)   10/26/22 102 kg (224 lb 12.8 oz)      BP Readings from Last 3 Encounters:   11/10/22 128/82   10/13/22 123/82        Patient Care Team:  Zo Vance MD as PCP - General (Family Medicine)  David Perry MD as Consulting Physician (General Surgery)     Physical Exam  Exam conducted with a chaperone present.   Chest:   Breasts:     Right: Mass present.          Comments: Small lump noted  Genitourinary:     Exam position: Knee-chest position.      Labia:         Right: No rash.         Left: No rash.       Vagina: Normal.      Cervix: Normal.      Adnexa: Right adnexa normal.      Comments: History of partial hysterectomy                      Assessment and Plan   Diagnoses and all orders for this visit:    1. Annual physical exam (Primary)  -     IgP, Aptima HPV; Future  -     Chlamydia trachomatis, Neisseria gonorrhoeae, PCR - , Cervix  -     Gardnerella vaginalis, Trichomonas vaginalis, Candida albicans, DNA - Swab, Vagina; Future  -     IgP, Aptima HPV  -     Gardnerella vaginalis, Trichomonas vaginalis, Candida " albicans, DNA - Swab, Vagina    2. Cervical cancer screening  -     IgP, Aptima HPV; Future  -     Chlamydia trachomatis, Neisseria gonorrhoeae, PCR - , Cervix  -     Gardnerella vaginalis, Trichomonas vaginalis, Candida albicans, DNA - Swab, Vagina; Future  -     IgP, Aptima HPV  -     Gardnerella vaginalis, Trichomonas vaginalis, Candida albicans, DNA - Swab, Vagina    3. Need for Tdap vaccination  -     Tdap Vaccine Greater Than or Equal To 8yo IM    4. Mass of lower inner quadrant of right breast  -     Mammo Diagnostic Digital Tomosynthesis Bilateral With CAD; Future  -     US Breast Bilateral Limited; Future          Tobacco Use: Medium Risk   • Smoking Tobacco Use: Former   • Smokeless Tobacco Use: Never   • Passive Exposure: Not on file            Follow Up   Return in about 3 months (around 2/10/2023) for Recheck, Next scheduled follow up.  Patient was given instructions and counseling regarding her condition or for health maintenance advice. Please see specific information pulled into the AVS if appropriate.

## 2022-11-10 NOTE — PRE-PROCEDURE INSTRUCTIONS
PATIENT INSTRUCTED TO BE:    - NPO AFTER MIDNIGHT EXCEPT CAN HAVE CLEAR LIQUIDS 2 HOURS PRIOR TO SURGERY ARRIVAL TIME     - TO HOLD ALL VITAMINS, SUPPLEMENTS, NSAIDS FOR ONE WEEK PRIOR TO THEIR SURGICAL PROCEDURE    - INSTRUCTED PT TO USE SURGICAL SOAP 1 TIME THE NIGHT PRIOR TO SURGERY OR THE AM OF SURGERY.   USE SOAP FROM NECK TO TOES AVOID THEIR FACE, HAIR, AND PRIVATE PARTS. INSTRUCTED NO LOTIONS, JEWELRY, PIERCINGS, OR DEODORANT DAY OF SURGERY    - IF DIABETIC, CHECK BLOOD GLUCOSE IF LESS THAN 70 CALL PREOP AREA -AM OF SURGERY     - INSTRUCTED TO TAKE THE FOLLOWING MEDICATIONS THE DAY OF SURGERY:    CYMBALTA, EYE DROPS PRN    PATIENT VERBALIZED UNDERSTANDING

## 2022-11-11 ENCOUNTER — ANESTHESIA EVENT (OUTPATIENT)
Dept: PERIOP | Facility: HOSPITAL | Age: 52
End: 2022-11-11

## 2022-11-14 ENCOUNTER — HOSPITAL ENCOUNTER (OUTPATIENT)
Facility: HOSPITAL | Age: 52
Setting detail: HOSPITAL OUTPATIENT SURGERY
Discharge: HOME | End: 2022-11-14
Attending: SURGERY | Admitting: SURGERY

## 2022-11-14 ENCOUNTER — ANESTHESIA (OUTPATIENT)
Dept: PERIOP | Facility: HOSPITAL | Age: 52
End: 2022-11-14

## 2022-11-14 VITALS
WEIGHT: 227.96 LBS | RESPIRATION RATE: 16 BRPM | HEART RATE: 94 BPM | BODY MASS INDEX: 37.98 KG/M2 | OXYGEN SATURATION: 100 % | SYSTOLIC BLOOD PRESSURE: 137 MMHG | HEIGHT: 65 IN | DIASTOLIC BLOOD PRESSURE: 78 MMHG | TEMPERATURE: 97.4 F

## 2022-11-14 DIAGNOSIS — D17.9 MULTIPLE LIPOMAS: ICD-10-CM

## 2022-11-14 PROCEDURE — 11402 EXC TR-EXT B9+MARG 1.1-2 CM: CPT | Performed by: SURGERY

## 2022-11-14 PROCEDURE — 11401 EXC TR-EXT B9+MARG 0.6-1 CM: CPT | Performed by: SURGERY

## 2022-11-14 PROCEDURE — 25010000002 CEFAZOLIN IN DEXTROSE 2-4 GM/100ML-% SOLUTION: Performed by: SURGERY

## 2022-11-14 PROCEDURE — 25010000002 ONDANSETRON PER 1 MG

## 2022-11-14 PROCEDURE — 88304 TISSUE EXAM BY PATHOLOGIST: CPT | Performed by: SURGERY

## 2022-11-14 PROCEDURE — 25010000002 PROPOFOL 10 MG/ML EMULSION: Performed by: NURSE ANESTHETIST, CERTIFIED REGISTERED

## 2022-11-14 PROCEDURE — 11403 EXC TR-EXT B9+MARG 2.1-3CM: CPT | Performed by: SURGERY

## 2022-11-14 PROCEDURE — 25010000002 MIDAZOLAM PER 1 MG: Performed by: ANESTHESIOLOGY

## 2022-11-14 PROCEDURE — 0 LIDOCAINE 1 % SOLUTION 10 ML VIAL: Performed by: SURGERY

## 2022-11-14 PROCEDURE — 25010000002 DEXAMETHASONE PER 1 MG

## 2022-11-14 PROCEDURE — 25010000002 FENTANYL CITRATE (PF) 50 MCG/ML SOLUTION: Performed by: NURSE ANESTHETIST, CERTIFIED REGISTERED

## 2022-11-14 PROCEDURE — 11406 EXC TR-EXT B9+MARG >4.0 CM: CPT | Performed by: SURGERY

## 2022-11-14 RX ORDER — DEXAMETHASONE SODIUM PHOSPHATE 4 MG/ML
INJECTION, SOLUTION INTRA-ARTICULAR; INTRALESIONAL; INTRAMUSCULAR; INTRAVENOUS; SOFT TISSUE AS NEEDED
Status: DISCONTINUED | OUTPATIENT
Start: 2022-11-14 | End: 2022-11-14 | Stop reason: SURG

## 2022-11-14 RX ORDER — PROPOFOL 10 MG/ML
VIAL (ML) INTRAVENOUS AS NEEDED
Status: DISCONTINUED | OUTPATIENT
Start: 2022-11-14 | End: 2022-11-14 | Stop reason: SURG

## 2022-11-14 RX ORDER — ONDANSETRON 2 MG/ML
INJECTION INTRAMUSCULAR; INTRAVENOUS
Status: DISCONTINUED
Start: 2022-11-14 | End: 2022-11-14 | Stop reason: HOSPADM

## 2022-11-14 RX ORDER — MAGNESIUM HYDROXIDE 1200 MG/15ML
LIQUID ORAL AS NEEDED
Status: DISCONTINUED | OUTPATIENT
Start: 2022-11-14 | End: 2022-11-14 | Stop reason: HOSPADM

## 2022-11-14 RX ORDER — LIDOCAINE HYDROCHLORIDE 20 MG/ML
INJECTION, SOLUTION EPIDURAL; INFILTRATION; INTRACAUDAL; PERINEURAL AS NEEDED
Status: DISCONTINUED | OUTPATIENT
Start: 2022-11-14 | End: 2022-11-14 | Stop reason: SURG

## 2022-11-14 RX ORDER — PHENYLEPHRINE HCL IN 0.9% NACL 1 MG/10 ML
SYRINGE (ML) INTRAVENOUS AS NEEDED
Status: DISCONTINUED | OUTPATIENT
Start: 2022-11-14 | End: 2022-11-14 | Stop reason: SURG

## 2022-11-14 RX ORDER — PROMETHAZINE HYDROCHLORIDE 12.5 MG/1
25 TABLET ORAL ONCE AS NEEDED
Status: DISCONTINUED | OUTPATIENT
Start: 2022-11-14 | End: 2022-11-14 | Stop reason: HOSPADM

## 2022-11-14 RX ORDER — MEPERIDINE HYDROCHLORIDE 25 MG/ML
12.5 INJECTION INTRAMUSCULAR; INTRAVENOUS; SUBCUTANEOUS
Status: DISCONTINUED | OUTPATIENT
Start: 2022-11-14 | End: 2022-11-14 | Stop reason: HOSPADM

## 2022-11-14 RX ORDER — SODIUM CHLORIDE 0.9 % (FLUSH) 0.9 %
10 SYRINGE (ML) INJECTION EVERY 12 HOURS SCHEDULED
Status: DISCONTINUED | OUTPATIENT
Start: 2022-11-14 | End: 2022-11-14 | Stop reason: HOSPADM

## 2022-11-14 RX ORDER — CEFAZOLIN SODIUM 2 G/100ML
2 INJECTION, SOLUTION INTRAVENOUS ONCE
Status: COMPLETED | OUTPATIENT
Start: 2022-11-14 | End: 2022-11-14

## 2022-11-14 RX ORDER — BUPIVACAINE HYDROCHLORIDE AND EPINEPHRINE 2.5; 5 MG/ML; UG/ML
INJECTION, SOLUTION EPIDURAL; INFILTRATION; INTRACAUDAL; PERINEURAL AS NEEDED
Status: DISCONTINUED | OUTPATIENT
Start: 2022-11-14 | End: 2022-11-14 | Stop reason: HOSPADM

## 2022-11-14 RX ORDER — HYDROCODONE BITARTRATE AND ACETAMINOPHEN 5; 325 MG/1; MG/1
1-2 TABLET ORAL EVERY 4 HOURS PRN
Qty: 10 TABLET | Refills: 0 | Status: SHIPPED | OUTPATIENT
Start: 2022-11-14 | End: 2023-01-27

## 2022-11-14 RX ORDER — OXYCODONE HYDROCHLORIDE 5 MG/1
5 TABLET ORAL
Status: DISCONTINUED | OUTPATIENT
Start: 2022-11-14 | End: 2022-11-14 | Stop reason: HOSPADM

## 2022-11-14 RX ORDER — EPHEDRINE SULFATE 50 MG/ML
INJECTION, SOLUTION INTRAVENOUS AS NEEDED
Status: DISCONTINUED | OUTPATIENT
Start: 2022-11-14 | End: 2022-11-14 | Stop reason: SURG

## 2022-11-14 RX ORDER — MIDAZOLAM HYDROCHLORIDE 1 MG/ML
2 INJECTION INTRAMUSCULAR; INTRAVENOUS ONCE
Status: COMPLETED | OUTPATIENT
Start: 2022-11-14 | End: 2022-11-14

## 2022-11-14 RX ORDER — SODIUM CHLORIDE 0.9 % (FLUSH) 0.9 %
10 SYRINGE (ML) INJECTION AS NEEDED
Status: DISCONTINUED | OUTPATIENT
Start: 2022-11-14 | End: 2022-11-14 | Stop reason: HOSPADM

## 2022-11-14 RX ORDER — HYDROCODONE BITARTRATE AND ACETAMINOPHEN 5; 325 MG/1; MG/1
1 TABLET ORAL ONCE AS NEEDED
Status: DISCONTINUED | OUTPATIENT
Start: 2022-11-14 | End: 2022-11-14 | Stop reason: HOSPADM

## 2022-11-14 RX ORDER — PROMETHAZINE HYDROCHLORIDE 25 MG/1
25 SUPPOSITORY RECTAL ONCE AS NEEDED
Status: DISCONTINUED | OUTPATIENT
Start: 2022-11-14 | End: 2022-11-14 | Stop reason: HOSPADM

## 2022-11-14 RX ORDER — GLYCOPYRROLATE 0.2 MG/ML
INJECTION INTRAMUSCULAR; INTRAVENOUS AS NEEDED
Status: DISCONTINUED | OUTPATIENT
Start: 2022-11-14 | End: 2022-11-14 | Stop reason: SURG

## 2022-11-14 RX ORDER — ONDANSETRON 2 MG/ML
INJECTION INTRAMUSCULAR; INTRAVENOUS AS NEEDED
Status: DISCONTINUED | OUTPATIENT
Start: 2022-11-14 | End: 2022-11-14 | Stop reason: SURG

## 2022-11-14 RX ORDER — SODIUM CHLORIDE 9 MG/ML
40 INJECTION, SOLUTION INTRAVENOUS AS NEEDED
Status: DISCONTINUED | OUTPATIENT
Start: 2022-11-14 | End: 2022-11-14 | Stop reason: HOSPADM

## 2022-11-14 RX ORDER — ONDANSETRON 2 MG/ML
4 INJECTION INTRAMUSCULAR; INTRAVENOUS ONCE AS NEEDED
Status: DISCONTINUED | OUTPATIENT
Start: 2022-11-14 | End: 2022-11-14 | Stop reason: HOSPADM

## 2022-11-14 RX ORDER — ACETAMINOPHEN 500 MG
1000 TABLET ORAL ONCE
Status: COMPLETED | OUTPATIENT
Start: 2022-11-14 | End: 2022-11-14

## 2022-11-14 RX ORDER — FENTANYL CITRATE 50 UG/ML
INJECTION, SOLUTION INTRAMUSCULAR; INTRAVENOUS AS NEEDED
Status: DISCONTINUED | OUTPATIENT
Start: 2022-11-14 | End: 2022-11-14 | Stop reason: SURG

## 2022-11-14 RX ORDER — SODIUM CHLORIDE, SODIUM LACTATE, POTASSIUM CHLORIDE, CALCIUM CHLORIDE 600; 310; 30; 20 MG/100ML; MG/100ML; MG/100ML; MG/100ML
9 INJECTION, SOLUTION INTRAVENOUS CONTINUOUS PRN
Status: DISCONTINUED | OUTPATIENT
Start: 2022-11-14 | End: 2022-11-14 | Stop reason: HOSPADM

## 2022-11-14 RX ORDER — DOCUSATE SODIUM 100 MG/1
100 CAPSULE, LIQUID FILLED ORAL 2 TIMES DAILY PRN
Qty: 10 CAPSULE | Refills: 1 | Status: SHIPPED | OUTPATIENT
Start: 2022-11-14 | End: 2023-01-27

## 2022-11-14 RX ADMIN — FENTANYL CITRATE 25 MCG: 50 INJECTION, SOLUTION INTRAMUSCULAR; INTRAVENOUS at 10:06

## 2022-11-14 RX ADMIN — PROPOFOL 200 MG: 10 INJECTION, EMULSION INTRAVENOUS at 09:43

## 2022-11-14 RX ADMIN — SODIUM CHLORIDE, POTASSIUM CHLORIDE, SODIUM LACTATE AND CALCIUM CHLORIDE: 600; 310; 30; 20 INJECTION, SOLUTION INTRAVENOUS at 11:11

## 2022-11-14 RX ADMIN — Medication 100 MCG: at 10:10

## 2022-11-14 RX ADMIN — EPHEDRINE SULFATE 10 MG: 50 INJECTION INTRAVENOUS at 10:02

## 2022-11-14 RX ADMIN — Medication 100 MCG: at 10:29

## 2022-11-14 RX ADMIN — GLYCOPYRROLATE 0.2 MG: 0.2 INJECTION INTRAMUSCULAR; INTRAVENOUS at 10:12

## 2022-11-14 RX ADMIN — OXYCODONE HYDROCHLORIDE 5 MG: 5 TABLET ORAL at 12:22

## 2022-11-14 RX ADMIN — Medication 100 MCG: at 10:17

## 2022-11-14 RX ADMIN — EPHEDRINE SULFATE 10 MG: 50 INJECTION INTRAVENOUS at 09:55

## 2022-11-14 RX ADMIN — Medication 200 MCG: at 10:35

## 2022-11-14 RX ADMIN — ACETAMINOPHEN 1000 MG: 500 TABLET ORAL at 08:48

## 2022-11-14 RX ADMIN — Medication 50 MCG: at 11:12

## 2022-11-14 RX ADMIN — Medication 100 MCG: at 10:20

## 2022-11-14 RX ADMIN — FENTANYL CITRATE 25 MCG: 50 INJECTION, SOLUTION INTRAMUSCULAR; INTRAVENOUS at 10:09

## 2022-11-14 RX ADMIN — ONDANSETRON 4 MG: 2 INJECTION INTRAMUSCULAR; INTRAVENOUS at 12:21

## 2022-11-14 RX ADMIN — EPHEDRINE SULFATE 10 MG: 50 INJECTION INTRAVENOUS at 10:10

## 2022-11-14 RX ADMIN — Medication 100 MCG: at 10:06

## 2022-11-14 RX ADMIN — Medication 50 MCG: at 11:21

## 2022-11-14 RX ADMIN — CEFAZOLIN SODIUM 2 G: 2 INJECTION, SOLUTION INTRAVENOUS at 09:39

## 2022-11-14 RX ADMIN — Medication 100 MCG: at 10:08

## 2022-11-14 RX ADMIN — ONDANSETRON 4 MG: 2 INJECTION INTRAMUSCULAR; INTRAVENOUS at 10:09

## 2022-11-14 RX ADMIN — MIDAZOLAM HYDROCHLORIDE 2 MG: 1 INJECTION, SOLUTION INTRAMUSCULAR; INTRAVENOUS at 09:23

## 2022-11-14 RX ADMIN — LIDOCAINE HYDROCHLORIDE 50 MG: 20 INJECTION, SOLUTION EPIDURAL; INFILTRATION; INTRACAUDAL; PERINEURAL at 09:43

## 2022-11-14 RX ADMIN — SODIUM CHLORIDE, POTASSIUM CHLORIDE, SODIUM LACTATE AND CALCIUM CHLORIDE 9 ML/HR: 600; 310; 30; 20 INJECTION, SOLUTION INTRAVENOUS at 08:48

## 2022-11-14 RX ADMIN — EPHEDRINE SULFATE 10 MG: 50 INJECTION INTRAVENOUS at 09:59

## 2022-11-14 RX ADMIN — FENTANYL CITRATE 50 MCG: 50 INJECTION, SOLUTION INTRAMUSCULAR; INTRAVENOUS at 09:43

## 2022-11-14 RX ADMIN — Medication 100 MCG: at 11:35

## 2022-11-14 RX ADMIN — DEXAMETHASONE SODIUM PHOSPHATE 4 MG: 4 INJECTION, SOLUTION INTRA-ARTICULAR; INTRALESIONAL; INTRAMUSCULAR; INTRAVENOUS; SOFT TISSUE at 09:59

## 2022-11-14 NOTE — DISCHARGE INSTRUCTIONS
DISCHARGE INSTRUCTIONS  SURGICAL / AMBULATORY  PROCEDURES      For your surgery you had:  Local anesthesia  Monitored anesthesia Care  You may experience dizziness, drowsiness, or light-headedness for several hours following surgery/procedure.  Do not stay alone today or tonight.  Limit your activity for 24 hours.  Resume your diet slowly.  Follow whatever special dietary instructions you may have been given by your doctor.  You should not drive or operate machinery, drink alcohol, or sign legally binding documents for 24 hours or while you are taking pain medication.    NOTIFY YOUR DOCTOR IF YOU EXPERIENCE ANY OF THE FOLLOWING:  Temperature greater than 101 degrees Fahrenheit  Shaking Chills  Redness or excessive drainage from incision  Nausea, vomiting and/or pain that is not controlled by prescribed medications  Increase in bleeding or bleeding that is excessive  Unable to urinate in 6 hours after surgery  If unable to reach your doctor, please go to the closest Emergency Room  You may shower tomorrow.  Apply an ice pack 24-48 hours.  Medications per physician instructions as indicated on Discharge Medication Information Sheet.    Last dose of pain medication was given at:   Tylenol (1000mg) last at 8:48am. Do not exceed 4000mg of tylenol in a 24 hour period.  Oxycodone last at 12:20pm. May take norco next at 4:20pm if needed.

## 2022-11-14 NOTE — OP NOTE
Preoperative diagnosis: Multiple lipomas    Postoperative diagnosis: Same    Procedure: Excision of multiple lipomas    Surgeon: Vicky    Anesthesia: General    Assistant: Tamara Ramirez    EBL: 11    Specimens:  Right lateral hip mass- 5 x 6 cm  Right inferior lateral thigh mass-1.5 x 1.5 cm  Right inferior medial thigh mass-1 x 1 cm  Right mid medial thigh mass-1 x 1 cm  Right mid lateral thigh mass-5 x 3 cm  Right superiormost medial thigh mass-1 x 1 cm  Right mid medial thigh mass-1.5 x 1.5 cm  Right lateral anterior thigh mass-1 x 1 cm  Right superior anterior thigh mass-1.5 x 1.5 cm  Right superior medial anterior thigh mass-1 x 1 cm  Right distal arm mass-1 x 1 cm  Second distal right arm mass-1 x 1 cm  Right mid arm mass-1.5 x 1.5 cm  Right proximal arm mass-1.5 x 1.5 cm  Posterior inferior right thigh mass-3 x 3 cm  Posterior superior right thigh mass-3 x 3 cm      Complications: None    Indications: 52-year-old female with multiple painful masses.  Presents today for elective excision.    PROCEDURE    Patient was seen in the preoperative holding area.  Risks, benefits, alternatives of the operation were again discussed in detail.  All of the patient and family's questions were answered.  They voiced understanding, and agreed to proceed.    Patient was brought to the operating room.  Monitoring devices and Jose stockings were placed.  Anesthesia was administered.  Patient was prepped and draped in standard surgical fashion.  After prepping and draping a timeout was performed to verify both the correct patient and correct procedure.  Each site was marked by the patient prior to her arrival.  The sites were remarked in the operating room prior to prepping and draping.    For each location-we began by injecting local anesthesia around the area of the mass.  We made incision over the most prominent portion of the mass.  We carried dissection down with combination of blunt dissection and electrocautery.  We  circumferentially dissected the mass until it was completely free.  It was amputated with electrocautery.  And passed off for pathology.  Some masses did come out in portions.  The wound bed of each mass site was irrigated with saline copiously.  Electrocautery was used to verify hemostasis.  We then closed each site with subcuticular. 4-0 Vicryl stitch and dressed with skin glue.  This was done for all 16 sites.  Each site was in the subcuticular adipose space.    The patient was then aroused from anesthesia, and taken off the OR table, and taken to PACU in stable condition.  Sponge, needle, and instrument counts were correct x2.  Tamara Ramirez was present for the entire procedure and helped in all portions of the case.    Assistant: Tamara Ramirez CSA was responsible for performing the following activities: Retraction, Suction, Irrigation, Suturing, Closing and Placing Dressing and their skilled assistance was necessary for the success of this case.      The operative note was dictated with the help of the dragon dictation system.

## 2022-11-14 NOTE — ANESTHESIA POSTPROCEDURE EVALUATION
Patient: Tiffanie Peres    Procedure Summary     Date: 11/14/22 Room / Location: [facility] OR 02 / [facility] MAIN OR    Anesthesia Start: 0939 Anesthesia Stop: 1200    Procedure: EXCISION MULTIPLE LIPOMAS UPPER LIPOMAS BILATERAL ARMS (Bilateral) Diagnosis:       Multiple lipomas      (Multiple lipomas [D17.9])    Surgeons: David Perry MD Provider: Tuan Ludwig MD    Anesthesia Type: general ASA Status: 3          Anesthesia Type: general    Vitals  Vitals Value Taken Time   /73 11/14/22 1218   Temp 36.1 °C (97 °F) 11/14/22 1205   Pulse 91 11/14/22 1219   Resp 12 11/14/22 1205   SpO2 99 % 11/14/22 1219   Vitals shown include unvalidated device data.        Post Anesthesia Care and Evaluation    Patient location during evaluation: bedside  Patient participation: complete - patient participated  Level of consciousness: awake  Pain management: adequate    Airway patency: patent  Anesthetic complications: No anesthetic complications  PONV Status: none  Cardiovascular status: acceptable  Respiratory status: acceptable  Hydration status: acceptable    Comments: An Anesthesiologist personally participated in the most demanding procedures (including induction and emergence if applicable) in the anesthesia plan, monitored the course of anesthesia administration at frequent intervals and remained physically present and available for immediate diagnosis and treatment of emergencies.

## 2022-11-14 NOTE — ANESTHESIA PREPROCEDURE EVALUATION
Anesthesia Evaluation     Patient summary reviewed and Nursing notes reviewed   no history of anesthetic complications:  NPO Solid Status: > 8 hours  NPO Liquid Status: > 2 hours           Airway   Mallampati: II  TM distance: >3 FB  Neck ROM: full  No difficulty expected  Dental      Pulmonary - negative pulmonary ROS and normal exam    breath sounds clear to auscultation  Cardiovascular - normal exam  Exercise tolerance: good (4-7 METS)    Rhythm: regular  Rate: normal    (+) hypertension,       Neuro/Psych- negative ROS  GI/Hepatic/Renal/Endo - negative ROS     Musculoskeletal (-) negative ROS    Abdominal    Substance History - negative use     OB/GYN negative ob/gyn ROS         Other - negative ROS       ROS/Med Hx Other: >4METS, HX FACTOR 5, ASYMPTOMATIC, HTN. KT                   Anesthesia Plan    ASA 3     general       Anesthetic plan, risks, benefits, and alternatives have been provided, discussed and informed consent has been obtained with: patient and spouse/significant other.        CODE STATUS:

## 2022-11-16 LAB
CYTO UR: NORMAL
LAB AP CASE REPORT: NORMAL
LAB AP CLINICAL INFORMATION: NORMAL
PATH REPORT.FINAL DX SPEC: NORMAL
PATH REPORT.GROSS SPEC: NORMAL

## 2022-11-17 LAB
CYTOLOGIST CVX/VAG CYTO: NORMAL
CYTOLOGY CVX/VAG DOC CYTO: NORMAL
CYTOLOGY CVX/VAG DOC THIN PREP: NORMAL
DX ICD CODE: NORMAL
HIV 1 & 2 AB SER-IMP: NORMAL
HPV I/H RISK 4 DNA CVX QL PROBE+SIG AMP: NEGATIVE
OTHER STN SPEC: NORMAL
STAT OF ADQ CVX/VAG CYTO-IMP: NORMAL

## 2022-11-21 ENCOUNTER — HOSPITAL ENCOUNTER (OUTPATIENT)
Dept: MAMMOGRAPHY | Facility: HOSPITAL | Age: 52
Discharge: HOME | End: 2022-11-21

## 2022-11-21 ENCOUNTER — TELEPHONE (OUTPATIENT)
Dept: ORTHOPEDIC SURGERY | Facility: CLINIC | Age: 52
End: 2022-11-21

## 2022-11-21 ENCOUNTER — HOSPITAL ENCOUNTER (OUTPATIENT)
Dept: ULTRASOUND IMAGING | Facility: HOSPITAL | Age: 52
Discharge: HOME | End: 2022-11-21

## 2022-11-21 DIAGNOSIS — N63.14 MASS OF LOWER INNER QUADRANT OF RIGHT BREAST: ICD-10-CM

## 2022-11-21 DIAGNOSIS — R21 SKIN RASH: Primary | ICD-10-CM

## 2022-11-21 PROCEDURE — 76642 ULTRASOUND BREAST LIMITED: CPT

## 2022-11-21 PROCEDURE — 77066 DX MAMMO INCL CAD BI: CPT

## 2022-11-21 PROCEDURE — G0279 TOMOSYNTHESIS, MAMMO: HCPCS

## 2022-11-21 RX ORDER — NYSTATIN AND TRIAMCINOLONE ACETONIDE 100000; 1 [USP'U]/G; MG/G
1 OINTMENT TOPICAL 2 TIMES DAILY
Qty: 30 G | Refills: 0 | Status: SHIPPED | OUTPATIENT
Start: 2022-11-21 | End: 2023-01-27

## 2022-11-21 NOTE — TELEPHONE ENCOUNTER
Caller: Tiffanie Peres    Relationship to patient: Self    Best call back number: [phone]    Chief complaint: NEEDS TO RESCHEDULE ANNABELLA      Requested date: 2/22/2023     If rescheduling, when is the original appointment: 11.30.22     Additional notes:PT IS UNABLE TO MAKE THE CURRENT APPOINTMENT.

## 2022-12-06 ENCOUNTER — OFFICE VISIT (OUTPATIENT)
Dept: SURGERY | Facility: CLINIC | Age: 52
End: 2022-12-06

## 2022-12-06 VITALS — HEIGHT: 65 IN | WEIGHT: 233.4 LBS | BODY MASS INDEX: 38.89 KG/M2

## 2022-12-06 DIAGNOSIS — D17.9 MULTIPLE LIPOMAS: Primary | ICD-10-CM

## 2022-12-06 PROCEDURE — 99024 POSTOP FOLLOW-UP VISIT: CPT | Performed by: SURGERY

## 2022-12-06 RX ORDER — BENZOCAINE/MENTHOL 6 MG-10 MG
LOZENGE MUCOUS MEMBRANE
COMMUNITY
Start: 2022-11-22 | End: 2023-01-27

## 2022-12-06 RX ORDER — CLOTRIMAZOLE 1 %
CREAM (GRAM) TOPICAL
COMMUNITY
Start: 2022-11-22

## 2022-12-06 RX ORDER — GINSENG 100 MG
CAPSULE ORAL
COMMUNITY
Start: 2022-11-22 | End: 2023-01-27

## 2022-12-06 RX ORDER — DIAPER,BRIEF,INFANT-TODD,DISP
EACH MISCELLANEOUS
COMMUNITY
Start: 2022-11-22

## 2022-12-06 RX ORDER — SULFAMETHOXAZOLE AND TRIMETHOPRIM 800; 160 MG/1; MG/1
1 TABLET ORAL 2 TIMES DAILY
Qty: 14 TABLET | Refills: 0 | Status: SHIPPED | OUTPATIENT
Start: 2022-12-06 | End: 2023-01-27

## 2022-12-06 NOTE — PROGRESS NOTES
"Chief Complaint: Post-op (Excision of lipomas/)    Subjective         History of Present Illness  Tiffanie Peres is a 52 y.o. female presents to [facility] GENERAL SUR[facility]ERY. The patient is to be seen for a follow-up after excision of multiple masses.    Follow-up after excision of multiple masses  The patient reports that she has a couple of areas that she believes are infected. She states that they started getting worse on Friday, 12/03/2022, and they broke all the way open. She notes that she is not healing as fast as she should be. She states that she covered it up before she came to avoid further infection and germs getting in it. She reports that the first week she was home, she propped herself up and made sure she did not do anything. She states that she went to sit down on the couch earlier and had pain. She denies \"picking\" at it; however, it has been leaking. She states that she is worried due to planning to be doing a lot of traveling this month. She reports that she has not taken antibiotics for a long time. She was a little more active last week, and she was in the car a lot more. She notes that she had a biopsy done last week and was told to put Vaseline on it twice daily and keep it covered for 2 weeks. She has been applying over-the-counter triple antibiotic ointment on the area nightly.    She states that she has been trying to use her left hand because she is practicing for carpal tunnel surgery.     The patient reports being scheduled for a colonoscopy in 02/2023.    Objective     Past Medical History:   Diagnosis Date   • Anxiety 1988   • Arthritis 2010   • CTS (carpal tunnel syndrome) 2012   • Deep vein thrombosis (HCC) 10/31/2001   • Depression 1988   • Eating disorder    • Factor 5 Leiden mutation, heterozygous ([facility])     ASYMPTOMATIC   • HL (hearing loss) 2017    right   • Hypertension    • Low back pain 2009   • Low back strain    • Multiple lipomas    • Neck strain    • " Obesity    • Pulmonary embolism (HCC) 10/31/2001   • Rotator cuff syndrome 2020   • Tear of meniscus of knee 2009    anoop   • Tennis elbow     right   • Visual impairment        Past Surgical History:   Procedure Laterality Date   • BIOPSY OF LEG Right    • BREAST BIOPSY  2016    Right side   • EXCISION LESION Bilateral 11/14/2022    Procedure: EXCISION MULTIPLE LIPOMAS UPPER LIPOMAS BILATERAL ARMS;  Surgeon: David Perry MD;  Location: [facility] MAIN OR;  Service: General;  Laterality: Bilateral;   • KNEE SURGERY Bilateral 2009   • LIPOMA EXCISION     • OTHER SURGICAL HISTORY  2010    rfa si joint lower back   • SUBTOTAL HYSTERECTOMY  2007    Still have cervix   • TUBAL ABDOMINAL LIGATION  1995         Current Outpatient Medications:   •  bacitracin 500 UNIT/GM ointment, , Disp: , Rfl:   •  cholecalciferol (VITAMIN D3) 1.25 MG (68210 UT) capsule, cholecalciferol (vitamin D3) 1,250 mcg (50,000 unit) capsule  take one capsule by mouth once weekly, Disp: , Rfl:   •  clotrimazole (LOTRIMIN) 1 % cream, , Disp: , Rfl:   •  docusate sodium (Colace) 100 MG capsule, Take 1 capsule by mouth 2 (Two) Times a Day As Needed for Constipation., Disp: 10 capsule, Rfl: 1  •  DULoxetine (CYMBALTA) 30 MG capsule, Take 1 capsule by mouth Daily., Disp: 90 capsule, Rfl: 1  •  HYDROcodone-acetaminophen (NORCO) 5-325 MG per tablet, Take 1-2 tablets by mouth Every 4 (Four) Hours As Needed (Pain)., Disp: 10 tablet, Rfl: 0  •  hydrocortisone 1 % cream, , Disp: , Rfl:   •  IODINE-VITAMIN A PO, Take 1 tablet by mouth Daily., Disp: , Rfl:   •  losartan-hydrochlorothiazide (HYZAAR) 50-12.5 MG per tablet, Take 1 tablet by mouth Daily., Disp: 90 tablet, Rfl: 1  •  Lubricating Plus Eye Drops 0.5 % solution, Administer 2 drops to both eyes Daily As Needed., Disp: , Rfl:   •  multivitamin with minerals tablet tablet, Take 1 tablet by mouth Daily., Disp: , Rfl:   •  NON FORMULARY, Biote pellets inserted to buttock cheek, Disp: , Rfl:   •   nystatin-triamcinolone (MYCOLOG) 528574-7.1 UNIT/GM-% ointment, Apply 1 application topically to the appropriate area as directed 2 (Two) Times a Day., Disp: 30 g, Rfl: 0  •  Sore Throat Lozenges 6-10 MG lozenge, , Disp: , Rfl:     Allergies   Allergen Reactions   • Naproxen Rash        Family History   Problem Relation Age of Onset   • Anxiety disorder Mother    • Arthritis Mother    • Depression Mother    • Heart disease Mother    • Hyperlipidemia Mother    • Clotting disorder Mother    • Osteoporosis Mother    • Rheumatologic disease Mother    • Heart disease Father    • Hyperlipidemia Father    • Anxiety disorder Sister    • Depression Sister    • Alcohol abuse Brother    • Drug abuse Brother    • Anxiety disorder Son    • Arthritis Maternal Grandmother    • Cancer Maternal Grandmother    • Diabetes Maternal Grandmother    • Vision loss Maternal Grandmother    • Malig Hyperthermia Neg Hx        Social History     Socioeconomic History   • Marital status:    Tobacco Use   • Smoking status: Former     Packs/day: 1.00     Years: 18.00     Pack years: 18.00     Types: Cigarettes     Quit date: 10/20/2001     Years since quittin.1   • Smokeless tobacco: Never   Vaping Use   • Vaping Use: Never used   Substance and Sexual Activity   • Alcohol use: Yes     Comment: One beer every few months   • Drug use: Never   • Sexual activity: Defer     Partners: Male     Birth control/protection: Same-sex partner        Physical Exam  Constitutional:       General: She is not in acute distress.     Appearance: Normal appearance. She is well-developed and normal weight.   Pulmonary:      Effort: Pulmonary effort is normal.      Breath sounds: Normal air entry.   Abdominal:      General: There is no distension.      Palpations: Abdomen is soft.      Tenderness: There is no abdominal tenderness.        Post Surgical Incision  Surgical wound: A couple areas on her right upper extremity and one area on the posterior left  lower extremity that have  a little bit. No signs of active infection.    Result Review :               Assessment and Plan    There are no diagnoses linked to this encounter.     1. Patient status post excision of multiple skin lesions.    She has a couple areas on her right upper extremity and one area on the posterior left lower extremity that have  a little bit. No signs of active infection, but we will give her a prescription as she will be out of town. She is worried about infection in the future, so we will give her a short course of antibiotic prescription if she needs it. Otherwise, she is doing well. She can follow up with me as needed. We will also give her some Optifoam dressings for home.     Discussed with patient- all questions were answered. She voiced understanding and agreed to proceed above plan.      Follow Up   No follow-ups on file.  Patient was given instructions and counseling regarding her condition or for health maintenance advice. Please see specific information pulled into the AVS if appropriate.       Transcribed from ambient dictation for David Perry MD by Leti Sweet.  12/06/22   15:26 EST    Patient or patient representative verbalized consent to the visit recording.  I have personally performed the services described in this document as transcribed by the above individual, and it is both accurate and complete.

## 2022-12-14 ENCOUNTER — TRANSCRIBE ORDERS (OUTPATIENT)
Dept: GENERAL RADIOLOGY | Facility: HOSPITAL | Age: 52
End: 2022-12-14

## 2022-12-14 ENCOUNTER — HOSPITAL ENCOUNTER (OUTPATIENT)
Dept: GENERAL RADIOLOGY | Facility: HOSPITAL | Age: 52
Discharge: HOME | End: 2022-12-14
Admitting: STUDENT IN AN ORGANIZED HEALTH CARE EDUCATION/TRAINING PROGRAM

## 2022-12-14 DIAGNOSIS — M47.817 LUMBOSACRAL SPONDYLOSIS WITHOUT MYELOPATHY: Primary | ICD-10-CM

## 2022-12-14 DIAGNOSIS — M47.817 LUMBOSACRAL SPONDYLOSIS WITHOUT MYELOPATHY: ICD-10-CM

## 2022-12-14 PROCEDURE — 72100 X-RAY EXAM L-S SPINE 2/3 VWS: CPT

## 2022-12-16 ENCOUNTER — TREATMENT (OUTPATIENT)
Dept: PHYSICAL THERAPY | Facility: CLINIC | Age: 52
End: 2022-12-16

## 2022-12-16 DIAGNOSIS — R26.2 DIFFICULTY WALKING: ICD-10-CM

## 2022-12-16 DIAGNOSIS — M54.50 CHRONIC BILATERAL LOW BACK PAIN WITHOUT SCIATICA: ICD-10-CM

## 2022-12-16 DIAGNOSIS — M53.3 SACRO ILIAL PAIN: Primary | ICD-10-CM

## 2022-12-16 DIAGNOSIS — G89.29 CHRONIC BILATERAL LOW BACK PAIN WITHOUT SCIATICA: ICD-10-CM

## 2022-12-16 PROCEDURE — 97110 THERAPEUTIC EXERCISES: CPT | Performed by: PHYSICAL THERAPIST

## 2022-12-16 PROCEDURE — 97161 PT EVAL LOW COMPLEX 20 MIN: CPT | Performed by: PHYSICAL THERAPIST

## 2022-12-16 NOTE — PROGRESS NOTES
Physical Therapy Initial Evaluation and Plan of Care  [address], [address] [place] [address]        Patient: Tiffanie Peres   : 1970  Diagnosis/ICD-10 Code:  Sacro ilial pain [M53.3]  Referring practitioner: Dereje Tolbert MD  Date of Initial Visit: 2022  Today's Date: 2022  Patient seen for 1 sessions           Subjective Questionnaire: Oswestry:       Subjective Evaluation    History of Present Illness  Mechanism of injury: Pt reports that she has been having SI pain since about  with no particular VANDANA.  Pt states that pain has gradually worsened.  Pt reports that she is now having lower back pain and R buttock pain as well.  Pt went to pain management and is going to begin RFA for both the SI and lower back.  Pt denies numbness/tingling in LEs.  Pt reports that she is to have CTS sx early next year.  Pt reports that walking, prolonged sitting on hard surfaces, prolonged standing and lifting increase her pain.  Pt reports that she is sedentary due to pain.  Pt has been prescribed Mobic but has not started taking it.  Pt is not working currently but would like to get back to recreational walking.     Pain  Current pain ratin  At best pain ratin  At worst pain rating: 10  Quality: sharp, dull ache, discomfort and burning  Aggravating factors: lifting, prolonged positioning, repetitive movement, sleeping, standing and movement  Progression: worsening    Social Support  Lives with: spouse    Treatments  Previous treatment: physical therapy  Patient Goals  Patient goals for therapy: decreased pain, increased motion, increased strength, independence with ADLs/IADLs and return to sport/leisure activities           Lumbar x-ray results:  FINDINGS:             No evidence of fracture or subluxation.  There are no lytic or sclerotic lesions.  Disc   degeneration and facet OA are present throughout the lumbar spine greatest at L5-S1.       IMPRESSION:  Degenerative change.  No  acute osseous abnormalities are identified in the lumbar spine.    Right hip(s) X-Ray results:  AP/Lateral view(s)  Findings: No fracture. No significant arthrosis noted. Arthritis noted at the SI joint.     Objective          Static Posture     Head  Forward.    Shoulders  Rounded.    Scapulae  Left protracted and right protracted.    Lumbar Spine   Increased lordosis.     Pelvis   Anterior pelvic tilt    Knee   Genu varus.     Palpation   Left   Tenderness of the erector spinae and lumbar paraspinals.     Right Tenderness of the erector spinae, lumbar paraspinals and obturator externus.     Tenderness     Additional Tenderness Details  Hypermobility noted during PA glides of L3-L5  Hypomobility and pain noted during sacral glides    Active Range of Motion   Left Hip   Normal active range of motion    Right Hip   Normal active range of motion    Additional Active Range of Motion Details  Lumbar AROM:   Flexion: WLF  Extension: WFL (pain)  Rotation: WFL (pain bilaterally)  Lateral flexion: WFL (pain bilaterally)      Strength/Myotome Testing     Left Hip   Planes of Motion   Flexion: 4  Extension: 4-  Abduction: 4-    Right Hip   Planes of Motion   Flexion: 4  Extension: 4-  Abduction: 4-    Left Knee   Flexion: 4+  Extension: 4+    Right Knee   Flexion: 4+  Extension: 4+    Left Ankle/Foot   Dorsiflexion: 5    Right Ankle/Foot   Dorsiflexion: 5    Tests     Lumbar     Left   Positive quadrant.   Negative passive SLR.     Right   Positive quadrant.   Negative passive SLR.     Left Pelvic Girdle/Sacrum   Negative: sacral spring.     Right Pelvic Girdle/Sacrum   Negative: sacral spring.     Left Hip   Negative ROCÍO, piriformis and scour.   90/90 SLR: Negative.     Right Hip   Positive ROCÍO and piriformis.   Negative scour.   90/90 SLR: Negative.    Additional Tests Details  Lumbar distraction negative for pain relief    Ambulation     Observational Gait     Additional Observational Gait Details  Pt demonstrates mild  R LE antalgic gait during ambulation.      General Comments     Hip Comments   No hamstring tightness noted  R piriformis tightness noted    Lumbar Comments  Light touch sensation normal in bilateral LEs           Assessment & Plan     Assessment  Impairments: abnormal gait, abnormal or restricted ROM, activity intolerance, impaired physical strength, lacks appropriate home exercise program and pain with function  Functional Limitations: lifting, sleeping, walking, pulling, pushing, uncomfortable because of pain, sitting, standing and unable to perform repetitive tasks  Assessment details: Patient presents with signs/symptoms consistent with lower back pain with R buttock referral and SI joint pain including bilateral hip and core weakness, lumbar spine hypermobility, R piriformis tightness and reports of pain limiting function during ADLs as shown on the ULISES. Patient would benefit from skilled PT services in order to address deficits limiting function at this time.  HEP was given to patient this session and education on HEP/diagnosis/proper posture provided to patient.        Goals  Plan Goals: 1. The patient complains of low back/SI pain.  LTG 1: 12 weeks:  The patient will report a pain rating of 2/10 or better in order to improve  tolerance to activities of daily living and improve sleep quality.  STATUS:  New  STG 1a: 6 weeks:  The patient will report a pain rating of 4/10 or better.  STATUS:  New    2. The patient demonstrates weakness of the bilateral hips.  LTG 2: 12 weeks:  The patient will demonstrate 4+/5 strength for bilateral hip flexion, abduction,  and extension in order to improve hip stability.  STATUS:  New  STG 2a: 6 weeks:  The patient will demonstrate 4 /5 strength for bilateral hip flexion, abduction,  and extension.  STATUS:  New    3. The patient has gait dysfunction.  LTG 3: 12 weeks:  The patient will ambulate without assistive device, independently, for   community distances with minimal  limp in order to improve mobility and allow   patient to perform activities such as grocery shopping with greater ease.  STATUS:  New    4. Mobility: Walking/Moving Around Functional Limitation    LTG 1: 12 weeks:  The patient will demonstrate 16 % limitation by achieving a score of 8/50 on the ULISES.  STATUS:  New  STG 1: 6 weeks:  The patient will demonstrate 24 % limitation by achieving a score of 12/50 on the ULISES.  STATUS:  New    Plan  Planned modality interventions: TENS, thermotherapy (hydrocollator packs) and cryotherapy  Planned therapy interventions: manual therapy, abdominal trunk stabilization, ADL retraining, neuromuscular re-education, body mechanics training, postural training, soft tissue mobilization, flexibility, spinal/joint mobilization, functional ROM exercises, strengthening, stretching, gait training, home exercise program, therapeutic activities and joint mobilization  Frequency: 2x week  Duration in weeks: 12  Treatment plan discussed with: patient        Timed:         Manual Therapy:    0     mins  89256;     Therapeutic Exercise:    10     mins  49929;     Neuromuscular Sheba:    0    mins  76212;    Therapeutic Activity:     0     mins  93050;     Gait Trainin     mins  62464;     Ultrasound:     0     mins  29282;    Ionto                               0    mins   50895  Self-care  __0__ mins 97189    Un-Timed:  Electrical Stimulation:    0     mins  92968 ( );  Traction     0     mins 09698  Low Eval     30     Mins  56114  Mod Eval     0     Mins  23388  High Eval                       0     Mins  32253  Hot pack     0     Mins    Cold pack                       0     Mins      Timed Treatment:   10   mins   Total Treatment:     40   mins    PT SIGNATURE: Lili Foster PT      Electronically signed 2022  [place] License: [id number]    Initial Certification    Certification Period: 2022 thru 3/15/2023  NPI: [id number]  I certify that the therapy services are furnished  while this patient is under my care.  The services outlined above are required by this patient, and will be reviewed every 90 days.     PHYSICIAN: Dereje Tolbert MD      DATE:     Please sign and return via fax to [phone].. Thank you, [facility].

## 2022-12-22 ENCOUNTER — TELEPHONE (OUTPATIENT)
Dept: SURGERY | Facility: CLINIC | Age: 52
End: 2022-12-22

## 2022-12-22 NOTE — TELEPHONE ENCOUNTER
Caller: VALERIE COATES  Relationship: SELF   Best call back number: [phone]    Who are you requesting to speak with (clinical staff, provider,  specific staff member): SURGERY SCHEDULER     What was the call regarding: PT CALLING TO RESCHEDULE COLONOSCOPY SCHEDULED ON 02/03/23    Do you require a callback: YES

## 2023-01-27 NOTE — PRE-PROCEDURE INSTRUCTIONS
Discussed prep and diet in prep for colonoscopy. Reviewed medications told pt she can take her cymbalta the morning of procedure arrival time 0630

## 2023-02-02 ENCOUNTER — TELEPHONE (OUTPATIENT)
Dept: SURGERY | Facility: CLINIC | Age: 53
End: 2023-02-02
Payer: OTHER GOVERNMENT

## 2023-02-02 NOTE — TELEPHONE ENCOUNTER
Patient's , Eren, called and wants to reschedule the colonoscopy on 02/03/23.  Patient is sick.      He said to please call him, instead of his wife, as she is sleeping.  His number is [phone].

## 2023-02-07 ENCOUNTER — APPOINTMENT (OUTPATIENT)
Dept: MAMMOGRAPHY | Facility: HOSPITAL | Age: 53
End: 2023-02-07

## 2023-02-09 ENCOUNTER — TELEPHONE (OUTPATIENT)
Dept: FAMILY MEDICINE CLINIC | Facility: CLINIC | Age: 53
End: 2023-02-09
Payer: OTHER GOVERNMENT

## 2023-02-22 ENCOUNTER — HOSPITAL ENCOUNTER (OUTPATIENT)
Facility: HOSPITAL | Age: 53
Setting detail: HOSPITAL OUTPATIENT SURGERY
Discharge: HOME | End: 2023-02-22
Attending: ORTHOPAEDIC SURGERY | Admitting: ORTHOPAEDIC SURGERY
Payer: OTHER GOVERNMENT

## 2023-02-22 ENCOUNTER — ANESTHESIA (OUTPATIENT)
Dept: PERIOP | Facility: HOSPITAL | Age: 53
End: 2023-02-22
Payer: OTHER GOVERNMENT

## 2023-02-22 ENCOUNTER — ANESTHESIA EVENT (OUTPATIENT)
Dept: PERIOP | Facility: HOSPITAL | Age: 53
End: 2023-02-22
Payer: OTHER GOVERNMENT

## 2023-02-22 VITALS
WEIGHT: 223.77 LBS | RESPIRATION RATE: 16 BRPM | OXYGEN SATURATION: 96 % | BODY MASS INDEX: 37.28 KG/M2 | DIASTOLIC BLOOD PRESSURE: 84 MMHG | HEART RATE: 68 BPM | SYSTOLIC BLOOD PRESSURE: 144 MMHG | HEIGHT: 65 IN | TEMPERATURE: 97.4 F

## 2023-02-22 DIAGNOSIS — G56.03 CARPAL TUNNEL SYNDROME ON BOTH SIDES: ICD-10-CM

## 2023-02-22 PROCEDURE — 25010000002 MIDAZOLAM PER 1 MG: Performed by: ANESTHESIOLOGY

## 2023-02-22 PROCEDURE — 25010000002 PROPOFOL 10 MG/ML EMULSION: Performed by: NURSE ANESTHETIST, CERTIFIED REGISTERED

## 2023-02-22 PROCEDURE — 25010000002 CEFAZOLIN IN DEXTROSE 2-4 GM/100ML-% SOLUTION: Performed by: ORTHOPAEDIC SURGERY

## 2023-02-22 PROCEDURE — 64721 CARPAL TUNNEL SURGERY: CPT | Performed by: ORTHOPAEDIC SURGERY

## 2023-02-22 RX ORDER — MIDAZOLAM HYDROCHLORIDE 1 MG/ML
2 INJECTION INTRAMUSCULAR; INTRAVENOUS ONCE
Status: COMPLETED | OUTPATIENT
Start: 2023-02-22 | End: 2023-02-22

## 2023-02-22 RX ORDER — ACETAMINOPHEN 500 MG
1000 TABLET ORAL ONCE
Status: COMPLETED | OUTPATIENT
Start: 2023-02-22 | End: 2023-02-22

## 2023-02-22 RX ORDER — DEXMEDETOMIDINE HYDROCHLORIDE 100 UG/ML
INJECTION, SOLUTION INTRAVENOUS AS NEEDED
Status: DISCONTINUED | OUTPATIENT
Start: 2023-02-22 | End: 2023-02-22 | Stop reason: SURG

## 2023-02-22 RX ORDER — BUPIVACAINE HYDROCHLORIDE 5 MG/ML
INJECTION, SOLUTION EPIDURAL; INTRACAUDAL AS NEEDED
Status: DISCONTINUED | OUTPATIENT
Start: 2023-02-22 | End: 2023-02-22 | Stop reason: HOSPADM

## 2023-02-22 RX ORDER — GLYCOPYRROLATE 0.2 MG/ML
INJECTION INTRAMUSCULAR; INTRAVENOUS AS NEEDED
Status: DISCONTINUED | OUTPATIENT
Start: 2023-02-22 | End: 2023-02-22 | Stop reason: SURG

## 2023-02-22 RX ORDER — LIDOCAINE HYDROCHLORIDE 20 MG/ML
INJECTION, SOLUTION EPIDURAL; INFILTRATION; INTRACAUDAL; PERINEURAL AS NEEDED
Status: DISCONTINUED | OUTPATIENT
Start: 2023-02-22 | End: 2023-02-22 | Stop reason: SURG

## 2023-02-22 RX ORDER — CEFAZOLIN SODIUM 2 G/100ML
2 INJECTION, SOLUTION INTRAVENOUS ONCE
Status: COMPLETED | OUTPATIENT
Start: 2023-02-22 | End: 2023-02-22

## 2023-02-22 RX ORDER — OXYCODONE HYDROCHLORIDE 5 MG/1
5 TABLET ORAL
Status: DISCONTINUED | OUTPATIENT
Start: 2023-02-22 | End: 2023-02-22 | Stop reason: HOSPADM

## 2023-02-22 RX ORDER — ONDANSETRON 2 MG/ML
4 INJECTION INTRAMUSCULAR; INTRAVENOUS ONCE AS NEEDED
Status: DISCONTINUED | OUTPATIENT
Start: 2023-02-22 | End: 2023-02-22 | Stop reason: HOSPADM

## 2023-02-22 RX ORDER — GLYCOPYRROLATE 0.2 MG/ML
0.2 INJECTION INTRAMUSCULAR; INTRAVENOUS
Status: COMPLETED | OUTPATIENT
Start: 2023-02-22 | End: 2023-02-22

## 2023-02-22 RX ORDER — SODIUM CHLORIDE, SODIUM LACTATE, POTASSIUM CHLORIDE, CALCIUM CHLORIDE 600; 310; 30; 20 MG/100ML; MG/100ML; MG/100ML; MG/100ML
9 INJECTION, SOLUTION INTRAVENOUS CONTINUOUS PRN
Status: DISCONTINUED | OUTPATIENT
Start: 2023-02-22 | End: 2023-02-22 | Stop reason: HOSPADM

## 2023-02-22 RX ORDER — PROMETHAZINE HYDROCHLORIDE 25 MG/1
25 SUPPOSITORY RECTAL ONCE AS NEEDED
Status: DISCONTINUED | OUTPATIENT
Start: 2023-02-22 | End: 2023-02-22 | Stop reason: HOSPADM

## 2023-02-22 RX ORDER — HYDROCODONE BITARTRATE AND ACETAMINOPHEN 7.5; 325 MG/1; MG/1
1 TABLET ORAL EVERY 4 HOURS PRN
Qty: 21 TABLET | Refills: 0 | Status: SHIPPED | OUTPATIENT
Start: 2023-02-22

## 2023-02-22 RX ORDER — PROPOFOL 10 MG/ML
VIAL (ML) INTRAVENOUS AS NEEDED
Status: DISCONTINUED | OUTPATIENT
Start: 2023-02-22 | End: 2023-02-22 | Stop reason: SURG

## 2023-02-22 RX ORDER — MAGNESIUM HYDROXIDE 1200 MG/15ML
LIQUID ORAL AS NEEDED
Status: DISCONTINUED | OUTPATIENT
Start: 2023-02-22 | End: 2023-02-22 | Stop reason: HOSPADM

## 2023-02-22 RX ORDER — PROMETHAZINE HYDROCHLORIDE 12.5 MG/1
25 TABLET ORAL ONCE AS NEEDED
Status: DISCONTINUED | OUTPATIENT
Start: 2023-02-22 | End: 2023-02-22 | Stop reason: HOSPADM

## 2023-02-22 RX ORDER — HYDROCODONE BITARTRATE AND ACETAMINOPHEN 7.5; 325 MG/1; MG/1
1 TABLET ORAL ONCE AS NEEDED
Status: DISCONTINUED | OUTPATIENT
Start: 2023-02-22 | End: 2023-02-22 | Stop reason: HOSPADM

## 2023-02-22 RX ORDER — CEFAZOLIN SODIUM IN 0.9 % NACL 3 G/100 ML
3 INTRAVENOUS SOLUTION, PIGGYBACK (ML) INTRAVENOUS ONCE
Status: DISCONTINUED | OUTPATIENT
Start: 2023-02-22 | End: 2023-02-22 | Stop reason: DRUGHIGH

## 2023-02-22 RX ORDER — HYDROCODONE BITARTRATE AND ACETAMINOPHEN 7.5; 325 MG/1; MG/1
1 TABLET ORAL EVERY 6 HOURS PRN
Qty: 21 TABLET | Refills: 0 | Status: SHIPPED | OUTPATIENT
Start: 2023-02-22

## 2023-02-22 RX ORDER — KETAMINE HCL IN NACL, ISO-OSM 100MG/10ML
SYRINGE (ML) INJECTION AS NEEDED
Status: DISCONTINUED | OUTPATIENT
Start: 2023-02-22 | End: 2023-02-22 | Stop reason: SURG

## 2023-02-22 RX ORDER — MEPERIDINE HYDROCHLORIDE 25 MG/ML
12.5 INJECTION INTRAMUSCULAR; INTRAVENOUS; SUBCUTANEOUS
Status: DISCONTINUED | OUTPATIENT
Start: 2023-02-22 | End: 2023-02-22 | Stop reason: HOSPADM

## 2023-02-22 RX ADMIN — PROPOFOL 200 MCG/KG/MIN: 10 INJECTION, EMULSION INTRAVENOUS at 07:12

## 2023-02-22 RX ADMIN — MIDAZOLAM HYDROCHLORIDE 2 MG: 2 INJECTION, SOLUTION INTRAMUSCULAR; INTRAVENOUS at 06:44

## 2023-02-22 RX ADMIN — ACETAMINOPHEN 1000 MG: 500 TABLET ORAL at 06:43

## 2023-02-22 RX ADMIN — Medication 15 MG: at 07:26

## 2023-02-22 RX ADMIN — SODIUM CHLORIDE, POTASSIUM CHLORIDE, SODIUM LACTATE AND CALCIUM CHLORIDE 9 ML/HR: 600; 310; 30; 20 INJECTION, SOLUTION INTRAVENOUS at 06:43

## 2023-02-22 RX ADMIN — DEXMEDETOMIDINE HYDROCHLORIDE 10 MCG: 100 INJECTION, SOLUTION, CONCENTRATE INTRAVENOUS at 07:23

## 2023-02-22 RX ADMIN — DEXMEDETOMIDINE HYDROCHLORIDE 5 MCG: 100 INJECTION, SOLUTION, CONCENTRATE INTRAVENOUS at 07:18

## 2023-02-22 RX ADMIN — PROPOFOL 100 MG: 10 INJECTION, EMULSION INTRAVENOUS at 07:12

## 2023-02-22 RX ADMIN — CEFAZOLIN SODIUM 2 G: 2 INJECTION, SOLUTION INTRAVENOUS at 07:13

## 2023-02-22 RX ADMIN — Medication 15 MG: at 07:21

## 2023-02-22 RX ADMIN — LIDOCAINE HYDROCHLORIDE 60 MG: 20 INJECTION, SOLUTION EPIDURAL; INFILTRATION; INTRACAUDAL; PERINEURAL at 07:12

## 2023-02-22 RX ADMIN — GLYCOPYRROLATE 0.2 MG: 0.2 INJECTION INTRAMUSCULAR; INTRAVENOUS at 07:22

## 2023-02-22 RX ADMIN — GLYCOPYRROLATE 0.2 MG: 0.2 INJECTION INTRAMUSCULAR; INTRAVENOUS at 06:44

## 2023-02-22 NOTE — H&P
"Chief Complaint  No chief complaint on file.     Duong Peres presents to [facility] OR for right hip. Patient has a history of bilateral carpal tunnel, in  EMG was done and stated carpal tunnel was on the severe end.  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 Patient has a history of SI joint issues.   she did steroid injection to right hip that gave some relief.     Allergies   Allergen Reactions   • Naproxen Rash        Social History     Socioeconomic History   • Marital status:    Tobacco Use   • Smoking status: Former     Packs/day: 1.00     Years: 18.00     Pack years: 18.00     Types: Cigarettes     Quit date: 10/20/2001     Years since quittin.3   • Smokeless tobacco: Never   Vaping Use   • Vaping Use: Never used   Substance and Sexual Activity   • Alcohol use: Yes     Comment: One beer every few months   • Drug use: Never   • Sexual activity: Defer        Review of Systems     Objective   Vital Signs:   Ht 165.1 cm (65\")   Wt 101 kg (223 lb 12.3 oz)   BMI 37.24 kg/m²       Physical Exam  Constitutional:       Appearance: Normal appearance. Patient is well-developed and normal weight.   HENT:      Head: Normocephalic.      Right Ear: Hearing and external ear normal.      Left Ear: Hearing and external ear normal.      Nose: Nose normal.   Eyes:      Conjunctiva/sclera: Conjunctivae normal.   Cardiovascular:      Rate and Rhythm: Normal rate.   Pulmonary:      Effort: Pulmonary effort is normal.      Breath sounds: No wheezing or rales.   Abdominal:      Palpations: Abdomen is soft.      Tenderness: There is no abdominal tenderness.   Musculoskeletal:      Cervical back: Normal range of motion.   Skin:     Findings: No rash.   Neurological:      Mental Status: Patient is alert and oriented to person, place, and time.   Psychiatric:         Mood and Affect: Mood and affect normal.         Judgment: " Judgment normal.       Ortho Exam      RIGHT HIP Full passive hip range of motion. No swelling. No atrophy. Calf soft. Non-tender hip and pelvic muscles. Good tone of hip flexors. Tender lateral hip and SI joint.     BILATERAL WRISTS Sensation intact. Neurovascular Intact. Intact finger ROM. Full , thumb opposition, MCP flexors, DIP flexors and PIP flexors. Numbness and tingling to B hands.  Positive Tinel's and Phalen's.        Procedures      Imaging Results (Most Recent)     None           Result Review :     X-Ray Report:  Right hip(s) X-Ray  Indication: Evaluation of right hip.   AP/Lateral view(s)  Findings: No fracture. No significant arthrosis noted. Arthritis noted at the SI joint.   Prior studies available for comparison: No        Assessment and Plan     There are no diagnoses linked to this encounter.    Discussed setting up patient with physician at pain management of right hip.  Refer to PT for therapy for SI pain.  Do not feel mandatory to get nerve test done.  Discussed bracing which she has done with no effect.  Patient is ready for surgery and right hand is worse.  Discussed risk and benefits of carpal tunnel release, she wishes to proceed with the right.       Discussed surgery., Risks/benefits discussed with patient including, but not limited to: infection, bleeding, neurovascular damage, re-rupture, aesthetic deformity, need for further surgery, and death. and Surgery pamphlet given.    Follow Up     Post Operatively    I have personally performed the services described in this document as scribed by the above individual and it is both accurate and complete. Dereje Tolbert MD 02/22/23

## 2023-02-22 NOTE — ANESTHESIA POSTPROCEDURE EVALUATION
Patient: Tiffanie Peres    Procedure Summary     Date: 02/22/23 Room / Location: [facility] OR  / [facility]    Anesthesia Start: 0709 Anesthesia Stop: 0742    Procedure: CARPAL TUNNEL RELEASE (Right: Wrist) Diagnosis:       Carpal tunnel syndrome on both sides      (Carpal tunnel syndrome on both sides [G56.03])    Surgeons: Dereje Tolbert MD Provider: Charles Raza MD    Anesthesia Type: general ASA Status: 3          Anesthesia Type: general    Vitals  Vitals Value Taken Time   /81 02/22/23 0814   Temp 36.3 °C (97.4 °F) 02/22/23 0739   Pulse 79 02/22/23 0814   Resp 16 02/22/23 0814   SpO2 97 % 02/22/23 0814           Post Anesthesia Care and Evaluation    Patient location during evaluation: bedside  Patient participation: complete - patient participated  Level of consciousness: awake  Pain management: adequate    Airway patency: patent  Anesthetic complications: No anesthetic complications  PONV Status: none  Cardiovascular status: acceptable and stable  Respiratory status: acceptable  Hydration status: acceptable    Comments: An Anesthesiologist personally participated in the most demanding procedures (including induction and emergence if applicable) in the anesthesia plan, monitored the course of anesthesia administration at frequent intervals and remained physically present and available for immediate diagnosis and treatment of emergencies.

## 2023-02-22 NOTE — OP NOTE
CARPAL TUNNEL RELEASE  Procedure Report    Patient Name:  Tiffanie Peres  YOB: 1970    Date of Surgery:  2/22/2023     Indications:  +CTS    Pre-op Diagnosis:   Carpal tunnel syndrome on both sides [G56.03]       Post-Op Diagnosis Codes:     * Carpal tunnel syndrome on both sides [G56.03]    Procedure/CPT® Codes:      Procedure(s):  CARPAL TUNNEL RELEASE RIGHT    Staff:  Surgeon(s):  Dereje Tolbert MD    Assistant: Stanford Aleman    Anesthesia: General    Estimated Blood Loss: none    Implants:    Nothing was implanted during the procedure    Specimen:          None        Findings: Carpal Tunnel Syndrome     Complications: None    Description of Procedure: The patient was brought to the operating room and underwent Katie block anesthesia without complication.  The patient received preoperative antibiotic and was then prepped and draped in sterile fashion.  Incision was made directly over the transverse carpal ligament and dissected down.  The ligament was identified and then opened using the #15 blade and then completed using tenotomy scissors.  Care was taken to avoid any neurovascular or tendinous structures and then checked with a [place] and a good release was achieved.  This was then thoroughly irrigated and closed using 4-0 nylon.  Half percent plain Marcaine was injected around the surgical site.  A sterile dressing was applied, followed by application of a splint.  The tourniquet was then deflated.  The patient was taken to the recovery room in stable condition.  There were no complications.    Assistant: Stanford Aleman  was responsible for performing the following activities: Retraction, Suction, Irrigation and Closing and their skilled assistance was necessary for the success of this case.    Dereje Tolbert MD     Date: 2/22/2023  Time: 07:38 EST

## 2023-02-22 NOTE — ANESTHESIA PREPROCEDURE EVALUATION
Anesthesia Evaluation     Patient summary reviewed and Nursing notes reviewed   no history of anesthetic complications:  NPO Solid Status: > 8 hours  NPO Liquid Status: > 2 hours           Airway   Mallampati: II  TM distance: >3 FB  Neck ROM: full  No difficulty expected  Dental      Pulmonary - negative pulmonary ROS and normal exam    breath sounds clear to auscultation  Cardiovascular - normal exam  Exercise tolerance: good (4-7 METS)    Rhythm: regular  Rate: normal    (+) hypertension,       Neuro/Psych- negative ROS  GI/Hepatic/Renal/Endo - negative ROS     Musculoskeletal (-) negative ROS    Abdominal    Substance History - negative use     OB/GYN negative ob/gyn ROS         Other - negative ROS       ROS/Med Hx Other: >4METS, HX FACTOR 5, ASYMPTOMATIC, HTN. KT                     Anesthesia Plan    ASA 3     general     intravenous induction     Anesthetic plan, risks, benefits, and alternatives have been provided, discussed and informed consent has been obtained with: patient.        CODE STATUS:

## 2023-03-07 ENCOUNTER — OFFICE VISIT (OUTPATIENT)
Dept: ORTHOPEDIC SURGERY | Facility: CLINIC | Age: 53
End: 2023-03-07
Payer: OTHER GOVERNMENT

## 2023-03-07 VITALS — HEIGHT: 65 IN | BODY MASS INDEX: 37.15 KG/M2 | OXYGEN SATURATION: 97 % | HEART RATE: 73 BPM | WEIGHT: 223 LBS

## 2023-03-07 DIAGNOSIS — Z47.89 AFTERCARE FOLLOWING SURGERY OF THE MUSCULOSKELETAL SYSTEM: Primary | ICD-10-CM

## 2023-03-07 PROCEDURE — 99024 POSTOP FOLLOW-UP VISIT: CPT | Performed by: PHYSICIAN ASSISTANT

## 2023-03-07 RX ORDER — LIRAGLUTIDE 6 MG/ML
INJECTION, SOLUTION SUBCUTANEOUS
COMMUNITY
Start: 2023-03-06

## 2023-03-07 NOTE — PROGRESS NOTES
"Chief Complaint  Pain and Follow-up of the Right Hand    Subjective      Tiffanie Peres presents to [facility] for follow-up of right carpal tunnel release performed on 2/22/2023 by Dr. Tolbert.  She presents today with postoperative splint and dressing in place, reporting she has tolerated this well.  Does complain of nausea this morning following suture removal in office.  She reports that her preoperative carpal tunnel syndrome symptoms are nearly entirely resolved.  She does state that she had numbness/tingling sensation to her entire right hand and pain, which would awaken her at night.  These symptoms are resolved, however, she is having persistent right thumb numbness, which she believes is related to positioning in her postoperative splint.    Objective   Allergies   Allergen Reactions   • Naproxen Rash       Vital Signs:   Pulse 73   Ht 165.1 cm (65\")   Wt 101 kg (223 lb)   SpO2 97%   BMI 37.11 kg/m²       Physical Exam    Constitutional: Awake, alert. Well nourished appearance.    Integumentary: Warm, dry, intact. No obvious rashes.    HENT: Atraumatic, normocephalic.   Respiratory: Non labored respirations .   Cardiovascular: Intact peripheral pulses.    Psychiatric: Normal mood and affect. A&O X3    Ortho Exam  Right wrist: Sutures removed from palmar aspect of right wrist, revealing well-healing surgical incision, which is clean, dry, and intact.  There is no evidence of wound dehiscence, surrounding erythema, warmth, or drainage.  Patient is able to form a loose fist.  Limited thumb opposition.  Reported sensation intact to all extremities with the exception of the first digit.  Brisk capillary refill and 2+ radial and ulnar pulses noted.  Limited flexion and extension of the wrist.  Full pronation and supination.    Imaging Results (Most Recent)     None                    Assessment and Plan   Problem List Items Addressed This Visit    None  Visit Diagnoses     " Aftercare following R CTR    -  Primary        Follow Up   Return in about 4 weeks (around 4/4/2023).  Patient is a former smoker.  Encouraged continued tobacco cessation.  Did not discuss options for smoking cessation.    Patient Instructions   Sutures removed in office.  Patient educated on incision care.  Please keep incision clean and dry.  Do not soak or submerge in water until incision is fully healed.  Do not apply creams or lotions over the incision. Continue icing and elevation as needed to help with pain and swelling.  Ice up to 3 or 4 times daily for no longer than 15 to 20 minutes at a time.    Patient advised on return to carpal tunnel brace(s) nightly and with activity. Carpal tunnel brace provided in office today. Avoid repetitive ROM of the hand or wrist.  No heavy lifting, pushing, or pulling until incision is fully healed.  Home exercises provided today.    Follow-up in 4 weeks. No imaging.  Please call with questions or concerns.      Patient was given instructions and counseling regarding her condition or for health maintenance advice. Please see specific information pulled into the AVS if appropriate.        Answers for HPI/ROS submitted by the patient on 3/6/2023  Please describe your symptoms.: Check up after surgery  Have you had these symptoms before?: No  How long have you been having these symptoms?: 1-4 days  What is the primary reason for your visit?: Other

## 2023-03-07 NOTE — PATIENT INSTRUCTIONS
Sutures removed in office.  Patient educated on incision care.  Please keep incision clean and dry.  Do not soak or submerge in water until incision is fully healed.  Do not apply creams or lotions over the incision. Continue icing and elevation as needed to help with pain and swelling.  Ice up to 3 or 4 times daily for no longer than 15 to 20 minutes at a time.    Patient advised on return to carpal tunnel brace(s) nightly and with activity. Carpal tunnel brace provided in office today. Avoid repetitive ROM of the hand or wrist.  No heavy lifting, pushing, or pulling until incision is fully healed.  Home exercises provided today.    Follow-up in 4 weeks. No imaging.  Please call with questions or concerns.

## 2023-03-14 ENCOUNTER — TREATMENT (OUTPATIENT)
Dept: PHYSICAL THERAPY | Facility: CLINIC | Age: 53
End: 2023-03-14
Payer: OTHER GOVERNMENT

## 2023-03-14 DIAGNOSIS — G89.29 CHRONIC BILATERAL LOW BACK PAIN WITHOUT SCIATICA: ICD-10-CM

## 2023-03-14 DIAGNOSIS — M54.50 CHRONIC BILATERAL LOW BACK PAIN WITHOUT SCIATICA: ICD-10-CM

## 2023-03-14 DIAGNOSIS — M53.3 SACRO ILIAL PAIN: Primary | ICD-10-CM

## 2023-03-14 DIAGNOSIS — R26.2 DIFFICULTY WALKING: ICD-10-CM

## 2023-03-14 PROCEDURE — 97110 THERAPEUTIC EXERCISES: CPT | Performed by: PHYSICAL THERAPIST

## 2023-03-14 PROCEDURE — 97530 THERAPEUTIC ACTIVITIES: CPT | Performed by: PHYSICAL THERAPIST

## 2023-03-14 NOTE — PROGRESS NOTES
Re-Assessment / Re-Certification  [address] [place] [address]      Patient: Tiffanie Peres   : 1970  Diagnosis/ICD-10 Code:  Sacro ilial pain [M53.3]  Referring practitioner: Dereje Tolbert MD  Date of Initial Visit: Type: THERAPY  Noted: 2022  Today's Date: 3/14/2023  Patient seen for 2 sessions    Progress note due: 2023  Re-cert due: 2023      Subjective:   Tiffanie Peres reports: 5/10 lower back and SI joint pain.  Pt reports that she had a steroid injection last week for her SI joint which did improve pain.  Pt reports that when she complete HEP exercises she has noticed improvements in overall pain and function.  Pt reports that she has been able to walk about 2 miles with decreased pain.  Pt reports that she continues to have difficulty and pain with all ADLS due to SI pain.  Pt has been unable to attend PT due to other health issues including CTS sx.  Subjective Questionnaire: Oswestry:   Clinical Progress: improved  Home Program Compliance: Yes  Treatment has included: therapeutic exercise and therapeutic activity    Subjective Evaluation         Objective   Strength/Myotome Testing      Left Hip   Planes of Motion   Flexion: 4  Extension: 4-  Abduction: 4-     Right Hip   Planes of Motion   Flexion: 4  Extension: 4-  Abduction: 4-     Left Knee   Flexion: 5  Extension: 5     Right Knee   Flexion: 5  Extension: 5     Left Ankle/Foot   Dorsiflexion: 5     Right Ankle/Foot   Dorsiflexion: 5  Assessment & Plan     Assessment  Impairments: abnormal gait, abnormal or restricted ROM, activity intolerance, impaired physical strength, lacks appropriate home exercise program and pain with function  Functional Limitations: lifting, sleeping, walking, pulling, pushing, uncomfortable because of pain, sitting, standing and unable to perform repetitive tasks  Assessment details: Patient continues to present with signs/symptoms consistent with lower back pain with R  buttock referral and SI joint pain including bilateral hip and core weakness, lumbar spine hypermobility, R piriformis tightness and reports of pain limiting function during ADLs as shown on the ULISES. Pt has not been able to attend PT since initial evaluation due to other medical issues and CTS.  Patient would continue to benefit from skilled PT services in order to address deficits limiting function at this time.       Goals  Plan Goals: 1. The patient complains of low back/SI pain.  LTG 1: 12 weeks:  The patient will report a pain rating of 2/10 or better in order to improve  tolerance to activities of daily living and improve sleep quality.  STATUS:  Not met, continue  STG 1a: 6 weeks:  The patient will report a pain rating of 4/10 or better.  STATUS:  Not met, continue    2. The patient demonstrates weakness of the bilateral hips.  LTG 2: 12 weeks:  The patient will demonstrate 4+/5 strength for bilateral hip flexion, abduction,  and extension in order to improve hip stability.  STATUS:  Not met, continue  STG 2a: 6 weeks:  The patient will demonstrate 4 /5 strength for bilateral hip flexion, abduction,  and extension.  STATUS:  Not met, continue    3. The patient has gait dysfunction.  LTG 3: 12 weeks:  The patient will ambulate without assistive device, independently, for   community distances with minimal limp in order to improve mobility and allow   patient to perform activities such as grocery shopping with greater ease.  STATUS:  Not met, continue    4. Mobility: Walking/Moving Around Functional Limitation    LTG 1: 12 weeks:  The patient will demonstrate 16 % limitation by achieving a score of 8/50 on the ULISES.  STATUS:  Not met, continue  STG 1: 6 weeks:  The patient will demonstrate 24 % limitation by achieving a score of 12/50 on the ULISES.  STATUS:  Not met, continue    Plan  Planned modality interventions: TENS, thermotherapy (hydrocollator packs) and cryotherapy  Planned therapy interventions: manual  therapy, abdominal trunk stabilization, ADL retraining, neuromuscular re-education, body mechanics training, postural training, soft tissue mobilization, flexibility, spinal/joint mobilization, functional ROM exercises, strengthening, stretching, gait training, home exercise program, therapeutic activities and joint mobilization  Frequency: 2x week  Duration in weeks: 12  Treatment plan discussed with: patient      Progress toward previous goals: Not Met      Recommendations: Continue as planned  Timeframe: 3 months  Prognosis to achieve goals: good    PT SIGNATURE: Lili Foster, PT     Electronically signed 3/14/2023  DATE TREATMENT INITIATED: 3/14/2023  [place] License: [id number]     Certification Period: 3/14/2023 thru 2023    Based upon review of the patient's progress and continued therapy plan, it is my medical opinion that Tiffanie Peres should continue physical therapy treatment at [facility]  [address] [place] [id number]  [phone].    Signature: __________________________________  Dereje Tolbert MD  NPI: [id number]    Timed:  Manual Therapy:    0     mins  46131;  Therapeutic Exercise:    18     mins  85719;     Neuromuscular Sheba:    0    mins  58234;    Therapeutic Activity:     8     mins  92436;     Gait Trainin     mins  96187;     Ultrasound:     0     mins  06184;    Electrical Stimulation:    0     mins  34268 ( );    Untimed:  Electrical Stimulation:    0     mins  85991 ( );  Mechanical Traction:    0     mins  58505;     Timed Treatment:   26   mins   Total Treatment:     30   mins

## 2023-04-05 DIAGNOSIS — G89.29 CHRONIC BILATERAL LOW BACK PAIN WITHOUT SCIATICA: ICD-10-CM

## 2023-04-05 DIAGNOSIS — R63.2 BINGE EATING: ICD-10-CM

## 2023-04-05 DIAGNOSIS — F32.A ANXIETY AND DEPRESSION: ICD-10-CM

## 2023-04-05 DIAGNOSIS — M54.50 CHRONIC BILATERAL LOW BACK PAIN WITHOUT SCIATICA: ICD-10-CM

## 2023-04-05 DIAGNOSIS — F41.9 ANXIETY AND DEPRESSION: ICD-10-CM

## 2023-04-05 RX ORDER — DULOXETIN HYDROCHLORIDE 30 MG/1
30 CAPSULE, DELAYED RELEASE ORAL DAILY
Qty: 90 CAPSULE | Refills: 1 | Status: SHIPPED | OUTPATIENT
Start: 2023-04-05

## 2023-04-13 ENCOUNTER — TREATMENT (OUTPATIENT)
Dept: PHYSICAL THERAPY | Facility: CLINIC | Age: 53
End: 2023-04-13
Payer: OTHER GOVERNMENT

## 2023-04-13 DIAGNOSIS — G89.29 CHRONIC BILATERAL LOW BACK PAIN WITHOUT SCIATICA: ICD-10-CM

## 2023-04-13 DIAGNOSIS — R26.2 DIFFICULTY WALKING: ICD-10-CM

## 2023-04-13 DIAGNOSIS — M54.50 CHRONIC BILATERAL LOW BACK PAIN WITHOUT SCIATICA: ICD-10-CM

## 2023-04-13 DIAGNOSIS — M53.3 SACRO ILIAL PAIN: Primary | ICD-10-CM

## 2023-04-13 PROCEDURE — 97110 THERAPEUTIC EXERCISES: CPT | Performed by: PHYSICAL THERAPIST

## 2023-04-13 PROCEDURE — 97530 THERAPEUTIC ACTIVITIES: CPT | Performed by: PHYSICAL THERAPIST

## 2023-04-13 NOTE — PROGRESS NOTES
Progress note  [address] [place] [address]      Patient: Tiffanie Peres   : 1970  Diagnosis/ICD-10 Code:  Sacro ilial pain [M53.3]  Referring practitioner: Dereje Tolbert MD  Date of Initial Visit: Type: THERAPY  Noted: 2022  Today's Date: 2023  Patient seen for 3 sessions        Subjective:   Tiffanie Peres reports: that she continues have the same pain in the SI and lower back compared to last progress note.  Pt rates that pain in the SI joint as a 7/10.  Pt reports that lower back/SI joint pain has been a 7/10 at worst the past week.  Pt reports that she continues to have difficulty with ADLs due to pain.  Pt states that she is still unable to walk long distances due to pain.  Pt reports that she is getting a nerve ablation for lumbar spine at the end of this month.  Pt reports decreased compliance with HEP.    Subjective Questionnaire: Oswestry:       Subjective   Objective   Strength/Myotome Testing      Left Hip   Planes of Motion   Flexion: 4+  Extension: 4  Abduction: 4     Right Hip   Planes of Motion   Flexion: 4+  Extension: 4  Abduction: 4     Left Knee   Flexion: 5  Extension: 5     Right Knee   Flexion: 5  Extension: 5     Left Ankle/Foot   Dorsiflexion: 5     Right Ankle/Foot   Dorsiflexion: 5  Assessment & Plan     Assessment  Impairments: abnormal gait, abnormal or restricted ROM, activity intolerance, impaired physical strength, lacks appropriate home exercise program and pain with function  Functional Limitations: lifting, sleeping, walking, pulling, pushing, uncomfortable because of pain, sitting, standing and unable to perform repetitive tasks  Assessment details: Patient presents with signs/symptoms consistent with lower back pain with R buttock referral and SI joint pain including bilateral hip and core weakness, lumbar spine hypermobility, R piriformis tightness and reports of pain limiting function during ADLs as shown on the ULISES.  Compared to  last progress note the pt demonstrates improvements in bilateral hip strength but still with deficits.  Otherwise reports of pain and function are the same.  Pt is limited in progress due to lack of compliance with HEP and PT attendance.  PT discussed with pt the lack of progress and that this may be due to lack of attendance to visits.  Attendance/cancellation policy discussed with pt this session and pt reports understanding.  Patient would continue to benefit from skilled PT services in order to address deficits limiting function at this time.      Goals  Plan Goals: 1. The patient complains of low back/SI pain.  LTG 1: 12 weeks:  The patient will report a pain rating of 2/10 or better in order to improve  tolerance to activities of daily living and improve sleep quality.  STATUS:  Not met, continue  STG 1a: 6 weeks:  The patient will report a pain rating of 4/10 or better.  STATUS:  Not met, continue    2. The patient demonstrates weakness of the bilateral hips.  LTG 2: 12 weeks:  The patient will demonstrate 4+/5 strength for bilateral hip flexion, abduction,  and extension in order to improve hip stability.  STATUS:  Partially met, continue  STG 2a: 6 weeks:  The patient will demonstrate 4 /5 strength for bilateral hip flexion, abduction,  and extension.  STATUS:  met    3. The patient has gait dysfunction.  LTG 3: 12 weeks:  The patient will ambulate without assistive device, independently, for   community distances with minimal limp in order to improve mobility and allow   patient to perform activities such as grocery shopping with greater ease.  STATUS:  Not met, continue    4. Mobility: Walking/Moving Around Functional Limitation    LTG 1: 12 weeks:  The patient will demonstrate 16 % limitation by achieving a score of 8/50 on the ULISES.  STATUS:  Not met, continue  STG 1: 6 weeks:  The patient will demonstrate 24 % limitation by achieving a score of 12/50 on the ULISES.  STATUS:  met    Plan  Planned modality  interventions: TENS, thermotherapy (hydrocollator packs) and cryotherapy  Planned therapy interventions: manual therapy, abdominal trunk stabilization, ADL retraining, neuromuscular re-education, body mechanics training, postural training, soft tissue mobilization, flexibility, spinal/joint mobilization, functional ROM exercises, strengthening, stretching, gait training, home exercise program, therapeutic activities and joint mobilization  Frequency: 2x week  Duration in weeks: 8  Treatment plan discussed with: patient      Progress toward previous goals: Partially Met        Recommendations: Continue as planned  Timeframe: 1 month  Prognosis to achieve goals: fair    PT SIGNATURE: Lili Foster, PT     Electronically signed 2023  DATE TREATMENT INITIATED: 2023  [place] License: [id number]      Based upon review of the patient's progress and continued therapy plan, it is my medical opinion that Tiffanie Peres should continue physical therapy treatment at [facility]  [address] [place] [id number]  [phone].      Timed:  Manual Therapy:    0     mins  66147;  Therapeutic Exercise:    22     mins  87633;     Neuromuscular Sheba:    0    mins  46715;    Therapeutic Activity:     8     mins  44491;     Gait Trainin     mins  15827;     Ultrasound:     0     mins  84651;    Electrical Stimulation:    0     mins  49092 ( );    Untimed:  Electrical Stimulation:    0     mins  91591 ( );  Mechanical Traction:    0     mins  20956;     Timed Treatment:   30   mins   Total Treatment:     33   mins

## 2023-04-14 ENCOUNTER — OFFICE VISIT (OUTPATIENT)
Dept: ORTHOPEDIC SURGERY | Facility: CLINIC | Age: 53
End: 2023-04-14
Payer: OTHER GOVERNMENT

## 2023-04-14 VITALS — HEIGHT: 65 IN | WEIGHT: 223 LBS | BODY MASS INDEX: 37.15 KG/M2

## 2023-04-14 DIAGNOSIS — Z47.89 AFTERCARE FOLLOWING SURGERY OF THE MUSCULOSKELETAL SYSTEM: Primary | ICD-10-CM

## 2023-04-14 PROCEDURE — 99024 POSTOP FOLLOW-UP VISIT: CPT | Performed by: PHYSICIAN ASSISTANT

## 2023-04-14 NOTE — PROGRESS NOTES
"Chief Complaint  Pain and Follow-up of the Right Wrist    Subjective      Tiffanie Peres presents to [facility] for follow-up of right carpal tunnel release performed on 2/22/2023 by Dr. Tolbert.  Patient presents today for follow-up reporting that she is \"not doing good\".  She reports that she has had persistent \"numbness and can feel the nerves in my thumb\".  She reports that this is \"different\" than her preoperative carpal tunnel syndrome symptoms.  She has been working on hand and wrist ROM and feels as if strength is returning.  She has continued use of nightly carpal tunnel brace.    Objective   Allergies   Allergen Reactions   • Naproxen Rash       Vital Signs:   Ht 165.1 cm (65\")   Wt 101 kg (223 lb)   BMI 37.11 kg/m²       Physical Exam    Constitutional: Awake, alert. Well nourished appearance.    Integumentary: Warm, dry, intact. No obvious rashes.    HENT: Atraumatic, normocephalic.   Respiratory: Non labored respirations .   Cardiovascular: Intact peripheral pulses.    Psychiatric: Normal mood and affect. A&O X3    Ortho Exam  Right wrist: Well-healed surgical scar noted to the palmar aspect of the patient's right wrist.  No incisional tenderness to palpation.  Patient reports decreased sensation to the right thumb and thenar prominence.  She is able to form a full fist.  Good thumb opposition.  Otherwise sensation reported intact to light touch.  Distal neurovascular intact with brisk capillary refill and 2+ radial and ulnar pulses noted.    Imaging Results (Most Recent)     None                    Assessment and Plan   Problem List Items Addressed This Visit    None  Visit Diagnoses     Aftercare following R CTR    -  Primary    Relevant Orders    Ambulatory Referral to Occupational Therapy          Follow Up   Return in 6 weeks (on 5/26/2023).  Patient is a former smoker.  Encouraged continued tobacco cessation.  Did not discuss smoking cessation.    Patient " Instructions   Patient can discontinue carpal tunnel brace. Referral placed for OT. Advised to continue HEP. Avoid repetitive ROM of the hand and wrist, as able.     Continue icing and elevation as needed.     Follow up in 6 weeks. Call with changes or concerns.       Patient was given instructions and counseling regarding her condition or for health maintenance advice. Please see specific information pulled into the AVS if appropriate.

## 2023-04-17 ENCOUNTER — PREP FOR SURGERY (OUTPATIENT)
Dept: OTHER | Facility: HOSPITAL | Age: 53
End: 2023-04-17
Payer: OTHER GOVERNMENT

## 2023-04-19 ENCOUNTER — TREATMENT (OUTPATIENT)
Dept: PHYSICAL THERAPY | Facility: CLINIC | Age: 53
End: 2023-04-19
Payer: OTHER GOVERNMENT

## 2023-04-19 DIAGNOSIS — M53.3 SACRO ILIAL PAIN: Primary | ICD-10-CM

## 2023-04-19 DIAGNOSIS — M54.50 CHRONIC BILATERAL LOW BACK PAIN WITHOUT SCIATICA: ICD-10-CM

## 2023-04-19 DIAGNOSIS — R29.898 WEAKNESS OF RIGHT HAND: ICD-10-CM

## 2023-04-19 DIAGNOSIS — M79.641 RIGHT HAND PAIN: ICD-10-CM

## 2023-04-19 DIAGNOSIS — G89.29 CHRONIC BILATERAL LOW BACK PAIN WITHOUT SCIATICA: ICD-10-CM

## 2023-04-19 DIAGNOSIS — Z98.890 S/P BILATERAL CARPAL TUNNEL RELEASE: Primary | ICD-10-CM

## 2023-04-19 DIAGNOSIS — R26.2 DIFFICULTY WALKING: ICD-10-CM

## 2023-04-19 PROCEDURE — 97530 THERAPEUTIC ACTIVITIES: CPT | Performed by: PHYSICAL THERAPIST

## 2023-04-19 PROCEDURE — 97110 THERAPEUTIC EXERCISES: CPT | Performed by: PHYSICAL THERAPIST

## 2023-04-19 NOTE — PROGRESS NOTES
Occupational Therapy Initial Evaluation and Plan of Care  [address] [facility], [address]   [place]  [address]      Patient: Tiffanie Peres   : 1970  Diagnosis/ICD-10 Code:  S/p bilateral carpal tunnel release [Z98.890]  Referring practitioner: Lissette Enamorado PA-C  Date of Initial Visit: 2023  Today's Date: 2023  Patient seen for 1 sessions               Subjective Evaluation    History of Present Illness  Date of surgery: 2023  Mechanism of injury: Patient reports an approx 10 year history of R CTS.  On 23 she underwent a R CTR.  She is now 8 weeks s/p surgery and is referred to therapy for evaluation and treatment.    PMHx:  Arthritis, depression, anxiety, previous neck injury          Patient Occupation: homemaker, likes to quilt Pain  Current pain ratin  At best pain ratin  At worst pain rating: 10  Quality: dull ache, knife-like, burning and discomfort  Relieving factors: ice  Progression: no change    Social Support  Lives with: family.    Hand dominance: right    Diagnostic Tests  EMG: abnormal    Patient Goals  Patient goals for therapy: decreased pain, increased strength, return to sport/leisure activities and independence with ADLs/IADLs  Patient goal: to have less pain and improved functional use of the R hand         Quick Dash 43/55 60-79% functional limitation    Objective          Observations     Additional Wrist/Hand Observation Details  Healed R CT incision.  Mild redness around incision.    Tenderness     Additional Tenderness Details  Moderate to severe tenderness in palm at times.    Neurological Testing     Sensation     Wrist/Hand     Right   Paresthesia: light touch    Additional Neurological Details  Reports having constant numbness and tingling in the R thumb.  Monafilament testing reveals 2.83 all digits R hand indicating sensation to be WFL's    Active Range of Motion     Left Wrist   Normal active range of motion    Right Wrist   Normal active range of  motion    Additional Active Range of Motion Details  AROM of the hands is WNL's    Strength/Myotome Testing     Left Wrist/Hand      (2nd hand position)     Trial 1: 70 lbs    Trial 2: 65 lbs    Trial 3: 55 lbs    Average: 63.33 lbs    Thumb Strength  Key/Lateral Pinch     Trial 1: 18 lbs    Trial 2: 18 lbs    Trial 3: 17 lbs    Average: 17.67 lbs    Right Wrist/Hand      (2nd hand position)     Trial 1: 30 lbs    Trial 2: 32 lbs    Trial 3: 35 lbs    Average: 32.33 lbs    Thumb Strength   Key/Lateral Pinch     Trial 1: 16 lbs    Trial 2: 17 lbs    Trial 3: 1 lbs    Average: 11.33 lbs    Swelling     Right Wrist/Hand     Additional Swelling Details  Mild in palm          Assessment & Plan     Assessment  Impairments: abnormal coordination, abnormal muscle tone, abnormal or restricted ROM, activity intolerance, impaired physical strength, lacks appropriate home exercise program and pain with function  Functional Limitations: carrying objects, lifting, sleeping, pulling, pushing, uncomfortable because of pain, reaching behind back, reaching overhead and unable to perform repetitive tasksPrognosis: good    Goals  Plan Goals: Hand/Finger pain, numbness and tingling  LTG 1  8 weeks:  Decrease pain level to 1/10 and report 75% less numbness and tingling to improve ADL status  Status:  New  STG 1 4 weeks:  Decrease pain level to 4/10 and report 25% less numbness and tingling  Status:  New    2.  Decreased strength  LTG 2  8 weeks:  Increase  strength to within norms for age to improve ability to grasp, lift, carry and handle objects  Status:  New  STG 2  4 weeks:  Increase R  strength to 40lbs  Status:  New    3.  Decreased functional use of the hand  LTG 3  8 weeks:  Improved score to 19/55 or better on Quick Dash  Status:  New  STG 3  4 weeks:  Improved score to 27/55 or better on Quick Dash  Status:  New      Plan  Planned modality interventions: iontophoresis, contrast bath immersion, cryotherapy,  electrical stimulation/Russian stimulation, hydrotherapy, TENS, thermotherapy (hydrocollator packs), thermotherapy (paraffin bath) and ultrasound  Planned therapy interventions: ADL retraining, balance/weight-bearing training, body mechanics training, compression, fine motor coordination training, flexibility, manual therapy, neuromuscular re-education, motor coordination training, orthotic fitting/training, postural training, soft tissue mobilization, spinal/joint mobilization, strengthening, stretching, IADL retraining, home exercise program, functional ROM exercises and therapeutic activities  Frequency: 2x week  Duration in weeks: 8  Treatment plan discussed with: patient  Plan details: Reviewed use of modalities, discussed HEP to include desensitization and stretching        Patient will be seen for skilled OT services    Visit Diagnoses:    ICD-10-CM ICD-9-CM   1. S/p bilateral carpal tunnel release  Z98.890 V45.89   2. Right hand pain  M79.641 729.5   3. Weakness of right hand  R29.898 728.87       Timed:  Manual Therapy:                 0     mins  24968  Therapeutic Exercise:      0     mins  41804     Neuromuscular reeducation       0     mins 93757  Therapeutic Activity              0     mins  54664  Ultrasound:                  0     mins  87003     Untimed:  Low eval:                       50     mins  15307  Mod eval                        0     mins  96198  Electrical Stimulation:    0     mins  44624 ( );  Fuidotherapy     0     mins  26695      Timed Treatment:   0   mins   Total Treatment:     50   mins    OT SIGNATURE: Jb Riggs OT, CHT  Electronic Signature  [place] license #: [id number]      Initial Certification  Certification Period: 4/19/2023 thru 7/17/2023  I certify that the therapy services are furnished while this patient is under my care.  The services outlined above are required by this patient, and will be reviewed every 90 days.     PHYSICIAN: Lissette Enamorado PA-C   NPI:  [id number]     DATE:       Please sign and return via fax to [phone].. Thank you, [facility].

## 2023-04-19 NOTE — PROGRESS NOTES
Physical Therapy Daily Treatment Note  [address] [facility], [address], [place] [address]      Patient: Tiffanie Peres   : 1970  Diagnosis/ICD-10 Code:  Sacro ilial pain [M53.3]  Referring practitioner: Dereje Tolbert MD  Date of Initial Visit: Type: THERAPY  Noted: 2022  Today's Date: 2023  Patient seen for 4 sessions             Subjective   Tiffanie Peres reports: 5/10 SI pain at beginning of session today.    Objective   See Exercise, Manual, and Modality Logs for complete treatment.       Assessment/Plan  Patient tolerated all exercise progressions well today but continues to demonstrate bilateral hip /core strength deficits limiting function.  PPT activation has improved compared to last session but pt continues to require intermittent cues.  Continue to progress per patient tolerance.    Progress per Plan of Care           Timed:  Manual Therapy:    0     mins  88017;  Therapeutic Exercise:    22     mins  55509;     Neuromuscular Sheba:    0    mins  73497;    Therapeutic Activity:     8     mins  78090;     Gait Trainin     mins  31921;     Ultrasound:     0     mins  19458;    Electrical Stimulation:    0     mins  69731;  Iontophoresis     0     mins  63855    Untimed:  Electrical Stimulation:    0     mins  57064 ( );  Mechanical Traction:    0     mins  02868;   Fluidotherapy     0     mins  58456  Hot pack     0     Mins    Cold pack                       0     Mins     Timed Treatment:   30   mins   Total Treatment:     30   mins        Lili Foster PT    Electronically signed [unfilled]  [place] License: [id number]  NPI number: [id number]

## 2023-04-20 ENCOUNTER — HOSPITAL ENCOUNTER (OUTPATIENT)
Facility: HOSPITAL | Age: 53
Setting detail: HOSPITAL OUTPATIENT SURGERY
Discharge: HOME | End: 2023-04-20
Attending: SURGERY | Admitting: SURGERY
Payer: OTHER GOVERNMENT

## 2023-04-20 ENCOUNTER — ANESTHESIA EVENT (OUTPATIENT)
Dept: GASTROENTEROLOGY | Facility: HOSPITAL | Age: 53
End: 2023-04-20
Payer: OTHER GOVERNMENT

## 2023-04-20 ENCOUNTER — ANESTHESIA (OUTPATIENT)
Dept: GASTROENTEROLOGY | Facility: HOSPITAL | Age: 53
End: 2023-04-20
Payer: OTHER GOVERNMENT

## 2023-04-20 VITALS
WEIGHT: 215.61 LBS | HEART RATE: 75 BPM | OXYGEN SATURATION: 98 % | BODY MASS INDEX: 35.92 KG/M2 | HEIGHT: 65 IN | RESPIRATION RATE: 16 BRPM | TEMPERATURE: 97.9 F | DIASTOLIC BLOOD PRESSURE: 98 MMHG | SYSTOLIC BLOOD PRESSURE: 145 MMHG

## 2023-04-20 DIAGNOSIS — Z13.9 SCREENING DUE: ICD-10-CM

## 2023-04-20 PROCEDURE — 25010000002 PROPOFOL 10 MG/ML EMULSION: Performed by: NURSE ANESTHETIST, CERTIFIED REGISTERED

## 2023-04-20 PROCEDURE — 88305 TISSUE EXAM BY PATHOLOGIST: CPT | Performed by: SURGERY

## 2023-04-20 RX ORDER — LIDOCAINE HYDROCHLORIDE 20 MG/ML
INJECTION, SOLUTION EPIDURAL; INFILTRATION; INTRACAUDAL; PERINEURAL AS NEEDED
Status: DISCONTINUED | OUTPATIENT
Start: 2023-04-20 | End: 2023-04-20 | Stop reason: SURG

## 2023-04-20 RX ORDER — SODIUM CHLORIDE, SODIUM LACTATE, POTASSIUM CHLORIDE, CALCIUM CHLORIDE 600; 310; 30; 20 MG/100ML; MG/100ML; MG/100ML; MG/100ML
30 INJECTION, SOLUTION INTRAVENOUS CONTINUOUS
Status: DISCONTINUED | OUTPATIENT
Start: 2023-04-20 | End: 2023-04-20 | Stop reason: HOSPADM

## 2023-04-20 RX ORDER — PROPOFOL 10 MG/ML
VIAL (ML) INTRAVENOUS AS NEEDED
Status: DISCONTINUED | OUTPATIENT
Start: 2023-04-20 | End: 2023-04-20 | Stop reason: SURG

## 2023-04-20 RX ADMIN — SODIUM CHLORIDE, POTASSIUM CHLORIDE, SODIUM LACTATE AND CALCIUM CHLORIDE: 600; 310; 30; 20 INJECTION, SOLUTION INTRAVENOUS at 15:34

## 2023-04-20 RX ADMIN — PROPOFOL 70 MG: 10 INJECTION, EMULSION INTRAVENOUS at 15:38

## 2023-04-20 RX ADMIN — LIDOCAINE HYDROCHLORIDE 50 MG: 20 INJECTION, SOLUTION EPIDURAL; INFILTRATION; INTRACAUDAL; PERINEURAL at 15:38

## 2023-04-20 RX ADMIN — PROPOFOL 200 MCG/KG/MIN: 10 INJECTION, EMULSION INTRAVENOUS at 15:38

## 2023-04-20 RX ADMIN — PROPOFOL 30 MG: 10 INJECTION, EMULSION INTRAVENOUS at 15:41

## 2023-04-20 NOTE — INTERVAL H&P NOTE
H&P reviewed. The patient was examined and there are no changes to the H&P.         This call was initiated due to the “Monthly Recall Report” first attempt.     I attempted to call patient to set up an appointment with .   Reason for visit: MWV   Date last seen:  4/18/17  Follow-up notes: none

## 2023-04-20 NOTE — ANESTHESIA POSTPROCEDURE EVALUATION
Patient: Tiffanie Peres    Procedure Summary     Date: 04/20/23 Room / Location: [facility] ENDOSCOPY 1 / [facility] ENDOSCOPY    Anesthesia Start: 1534 Anesthesia Stop: 1612    Procedure: COLONOSCOPY WITH POLYPECTOMY Diagnosis:       Screening due      (Screening due [Z13.9])    Surgeons: David Perry MD Provider: Jose Contreras MD    Anesthesia Type: general ASA Status: 3          Anesthesia Type: general    Vitals  Vitals Value Taken Time   /98 04/20/23 1631   Temp 36.6 °C (97.9 °F) 04/20/23 1631   Pulse 75 04/20/23 1631   Resp 16 04/20/23 1631   SpO2 98 % 04/20/23 1631           Post Anesthesia Care and Evaluation    Patient location during evaluation: bedside  Patient participation: complete - patient participated  Level of consciousness: awake  Pain management: adequate    Airway patency: patent  PONV Status: none  Cardiovascular status: acceptable and stable  Respiratory status: acceptable  Hydration status: acceptable    Comments: An Anesthesiologist personally participated in the most demanding procedures (including induction and emergence if applicable) in the anesthesia plan, monitored the course of anesthesia administration at frequent intervals and remained physically present and available for immediate diagnosis and treatment of emergencies.

## 2023-04-20 NOTE — H&P
History of Present Illness  Colon cancer screening  The patient denies previously having a colonoscopy. She denies any blood in her stool, narrow stools, thin stools, unexplained weight loss, or night sweats. She denies any family history of colon cancer, Crohn's disease, ulcerative colitis, or Hubbard syndrome. The patient reports that she does have hemorrhoids.     Lipoma  She has a history of lipomas. The patient reports that she has had approximately 16 lipomas removed previously. She notes that she had 5 or 6 lipomas removed in 2008 and 10 lipomas removed in 2013. The patient states that she has approximately 100 currently and a lot of them are causing pain. She mentions that she has gained 70 pounds since 2013 and was trying to wait until she lost weight before getting them removed. The patient notes that sitting on the toilet seat is painful. She states that some of her arms have appeared in the last 6 months and they hurt all the time. She reports that any movement, such as doing yoga, intercourse, laying down, and resting her arms down have caused them to hurt.     Tiffanie Peres is a 52 y.o. female presents to [facility]. The patient is to be seen for colonoscopy screening and multiple lipomas.           Objective         Medical History        Past Medical History:   Diagnosis Date   • Anxiety 1988   • Arthritis 2010   • Bleeding disorder (HCC) 10/31/2001   • Deep vein thrombosis (HCC) 10/31/2001   • Depression 1988   • Eating disorder     • History of medical problems       Factor v lieden   • HL (hearing loss) 2017   • Hypertension     • Low back pain 2009   • Obesity     • Pulmonary embolism (HCC) 10/31/2001   • Visual impairment              Surgical History         Past Surgical History:   Procedure Laterality Date   • BREAST BIOPSY   2016     Right side   • SUBTOTAL HYSTERECTOMY   2007     Still have cervix   • TUBAL ABDOMINAL LIGATION   1995               Current  "Outpatient Medications:   •  cholecalciferol (VITAMIN D3) 1.25 MG (74775 UT) capsule, cholecalciferol (vitamin D3) 1,250 mcg (50,000 unit) capsule  take one capsule by mouth once weekly, Disp: , Rfl:   •  conjugated estrogens in sodium chloride 0.9 % 50 mL IVPB, Infuse  into a venous catheter., Disp: , Rfl:   •  DULoxetine (CYMBALTA) 30 MG capsule, Take 1 capsule by mouth Daily., Disp: 90 capsule, Rfl: 1  •  erythromycin (ROMYCIN) 5 MG/GM ophthalmic ointment, , Disp: , Rfl:   •  IODINE-VITAMIN A PO, iodine, Disp: , Rfl:   •  losartan-hydrochlorothiazide (HYZAAR) 50-12.5 MG per tablet, Take 1 tablet by mouth Daily., Disp: 90 tablet, Rfl: 1  •  Lubricating Plus Eye Drops 0.5 % solution, , Disp: , Rfl:           Allergies   Allergen Reactions   • Naproxen Rash               Family History   Problem Relation Age of Onset   • Anxiety disorder Mother     • Arthritis Mother     • Depression Mother     • Heart disease Mother     • Hyperlipidemia Mother     • Heart disease Father     • Hyperlipidemia Father     • Arthritis Maternal Grandmother     • Cancer Maternal Grandmother     • Diabetes Maternal Grandmother     • Vision loss Maternal Grandmother     • Alcohol abuse Brother     • Drug abuse Brother     • Anxiety disorder Son     • Anxiety disorder Sister     • Depression Sister           Social History   Social History            Socioeconomic History   • Marital status:    Tobacco Use   • Smoking status: Former       Packs/day: 1.00       Years: 18.00       Pack years: 18.00       Types: Cigarettes       Quit date: 10/20/2001       Years since quittin.0   • Smokeless tobacco: Never   Vaping Use   • Vaping Use: Never used   Substance and Sexual Activity   • Alcohol use: Yes       Comment: One beer every few months   • Sexual activity: Yes       Partners: Male       Birth control/protection: Same-sex partner            Vital Signs:   Ht 162.6 cm (64\")   Wt 102 kg (224 lb 12.8 oz)   BMI 38.59 kg/m²    "   Physical Exam  Vitals and nursing note reviewed.   Constitutional:       General: She is not in acute distress.     Appearance: Normal appearance. She is well-developed and normal weight.   Cardiovascular:      Rate and Rhythm: Normal rate and regular rhythm.   Pulmonary:      Effort: Pulmonary effort is normal.      Breath sounds: Normal air entry.   Abdominal:      General: Bowel sounds are normal.      Palpations: Abdomen is soft.   Skin:     General: Skin is warm and dry.      Comments: On her arms, legs, chest, posterior thighs, she has multiple small lipomas that they range from 1 cm to 5 cm. They are tender. There is no overlying skin changes.   Neurological:      Mental Status: She is alert and oriented to person, place, and time.      Motor: Motor function is intact.   Psychiatric:         Mood and Affect: Mood normal.                  Result Review    :               Procedures            Assessment      Assessment and Plan    There are no diagnoses linked to this encounter.        Colon cancer screening and Lipomas  -The patient is in need of screening colonoscopy. She has never had a colonoscopy before. She is not currently having any symptoms. She does not have any family history and patient has multiple lipomas. I discussed with the patient we will probably try and do 10 to 15 at one time. We will do this in the operating room. Risks, benefits, alternatives of the procedure were discussed extensively. All questions were answered. Patient voiced understanding and agreed to proceed with the above plan.  Follow Up   No follow-ups on file.  Patient was given instructions and counseling regarding her condition or for health maintenance advice. Please see specific information pulled into the AVS if appropriate.         Transcribed from ambient dictation for David Perry MD by Lulu Wells.  10/26/22   15:12 EDT     Patient or patient representative verbalized consent to the visit recording.  I have  personally performed the services described in this document as transcribed by the above individual, and it is both accurate and complete.                     Office Visit on 10/26/2022     Office Visit on 10/26/2022        Revision History

## 2023-04-20 NOTE — ANESTHESIA PREPROCEDURE EVALUATION
Anesthesia Evaluation     Patient summary reviewed and Nursing notes reviewed   no history of anesthetic complications:  NPO Solid Status: > 8 hours  NPO Liquid Status: > 2 hours           Airway   Mallampati: II  TM distance: >3 FB  Neck ROM: full  No difficulty expected  Dental      Pulmonary - normal exam    breath sounds clear to auscultation  (+) pulmonary embolism,   Cardiovascular - normal exam  Exercise tolerance: good (4-7 METS)    Rhythm: regular  Rate: normal    (+) hypertension,       Neuro/Psych- negative ROS  GI/Hepatic/Renal/Endo - negative ROS     Musculoskeletal (-) negative ROS    Abdominal    Substance History - negative use     OB/GYN negative ob/gyn ROS         Other - negative ROS       ROS/Med Hx Other: PAT Nursing Notes unavailable.                   Anesthesia Plan    ASA 3     general       Anesthetic plan, risks, benefits, and alternatives have been provided, discussed and informed consent has been obtained with: patient and spouse/significant other.        CODE STATUS:

## 2023-04-25 ENCOUNTER — TREATMENT (OUTPATIENT)
Dept: PHYSICAL THERAPY | Facility: CLINIC | Age: 53
End: 2023-04-25
Payer: OTHER GOVERNMENT

## 2023-04-25 DIAGNOSIS — Z98.890 S/P BILATERAL CARPAL TUNNEL RELEASE: Primary | ICD-10-CM

## 2023-04-25 DIAGNOSIS — R29.898 WEAKNESS OF RIGHT HAND: ICD-10-CM

## 2023-04-25 DIAGNOSIS — M79.641 RIGHT HAND PAIN: ICD-10-CM

## 2023-04-25 NOTE — PROGRESS NOTES
Occupational Therapy Daily Treatment Note  [address] [facility], [address]   [place]  [address]      Patient: Tiffanie Peres   : 1970  Referring practitioner: Dereje Tolbert MD  Date of Initial Visit: Type: THERAPY  Noted: 2023  Today's Date: 2023  Patient seen for 2 sessions           Subjective       Objective   See Exercise, Manual, and Modality Logs for complete treatment.       Assessment & Plan     Assessment    Assessment details: Good tolerance to exercises.  Continue with plan of care.        Visit Diagnoses:    ICD-10-CM ICD-9-CM   1. S/p bilateral carpal tunnel release  Z98.890 V45.89   2. Right hand pain  M79.641 729.5   3. Weakness of right hand  R29.898 728.87       Progress per Plan of Care           Timed:  Manual Therapy:                 4     mins  81273  Therapeutic Exercise:      10     mins  57121     Neuromuscular reeducation       2     mins 88416  Therapeutic Activity              8     mins  92844  Ultrasound:                  0     mins  33757     Untimed:  Electrical Stimulation:    0     mins  99924 ( );  Fluidotherapy      0     mins  73825    Timed Treatment:   24   mins   Total Treatment:     24   mins    Jb Riggs OT, CHT  Occupational Therapist    Electronically signed      [place] license #: [id number]

## 2023-04-27 ENCOUNTER — TREATMENT (OUTPATIENT)
Dept: PHYSICAL THERAPY | Facility: CLINIC | Age: 53
End: 2023-04-27
Payer: OTHER GOVERNMENT

## 2023-04-27 DIAGNOSIS — M79.641 RIGHT HAND PAIN: ICD-10-CM

## 2023-04-27 DIAGNOSIS — Z98.890 S/P CARPAL TUNNEL RELEASE: Primary | ICD-10-CM

## 2023-04-27 DIAGNOSIS — R29.898 WEAKNESS OF RIGHT HAND: ICD-10-CM

## 2023-04-27 DIAGNOSIS — I10 PRIMARY HYPERTENSION: ICD-10-CM

## 2023-04-27 RX ORDER — LOSARTAN POTASSIUM AND HYDROCHLOROTHIAZIDE 12.5; 5 MG/1; MG/1
1 TABLET ORAL DAILY
Qty: 90 TABLET | Refills: 1 | Status: SHIPPED | OUTPATIENT
Start: 2023-04-27

## 2023-04-27 NOTE — PROGRESS NOTES
Occupational Therapy Daily Treatment Note  [facility] OT: [address] [place] [address]      Patient: Tiffanie Peres   : 1970  Diagnosis/ICD-10 Code:  S/P carpal tunnel release [Z98.890]  Referring practitioner: Lissette Enamorado PA-C  Date of Initial Visit: Type: THERAPY  Noted: 2023  Today's Date: 2023  Patient seen for 3 sessions             Subjective   Tiffanie Peres reports: 5/10 pain R hand.    Objective     See Exercise, Manual, and Modality Logs for complete treatment.       Assessment/Plan  Pt tolerated exercises well this date.  Progress per Plan of Care           Timed:  Manual Therapy:    4     mins  12914;  Therapeutic Exercise:    15     mins  32898;     Neuromuscular Sheba:    8    mins  58095;    Therapeutic Activity:     11     mins  56355;       Timed Treatment:   38   mins   Total Treatment:     38   mins        Raj Jacobo OT  Occupational Therapist  [place] License: [id number]  NPI: [id number]

## 2023-05-02 ENCOUNTER — TREATMENT (OUTPATIENT)
Dept: PHYSICAL THERAPY | Facility: CLINIC | Age: 53
End: 2023-05-02
Payer: OTHER GOVERNMENT

## 2023-05-02 DIAGNOSIS — R26.2 DIFFICULTY WALKING: ICD-10-CM

## 2023-05-02 DIAGNOSIS — M54.50 CHRONIC BILATERAL LOW BACK PAIN WITHOUT SCIATICA: ICD-10-CM

## 2023-05-02 DIAGNOSIS — M53.3 SACRO ILIAL PAIN: Primary | ICD-10-CM

## 2023-05-02 DIAGNOSIS — G89.29 CHRONIC BILATERAL LOW BACK PAIN WITHOUT SCIATICA: ICD-10-CM

## 2023-05-02 DIAGNOSIS — Z98.890 S/P BILATERAL CARPAL TUNNEL RELEASE: ICD-10-CM

## 2023-05-02 DIAGNOSIS — R29.898 WEAKNESS OF RIGHT HAND: Primary | ICD-10-CM

## 2023-05-02 PROCEDURE — 97530 THERAPEUTIC ACTIVITIES: CPT | Performed by: OCCUPATIONAL THERAPIST

## 2023-05-02 PROCEDURE — 97110 THERAPEUTIC EXERCISES: CPT | Performed by: PHYSICAL THERAPIST

## 2023-05-02 PROCEDURE — 97530 THERAPEUTIC ACTIVITIES: CPT | Performed by: PHYSICAL THERAPIST

## 2023-05-02 PROCEDURE — 97110 THERAPEUTIC EXERCISES: CPT | Performed by: OCCUPATIONAL THERAPIST

## 2023-05-02 NOTE — PROGRESS NOTES
Occupational Therapy Daily Treatment Note  [address] [facility], [address]   [place]  [address]      Patient: Tiffanie Peres   : 1970  Referring practitioner: Lissette Enamorado PA-C  Date of Initial Visit: Type: THERAPY  Noted: 2023  Today's Date: 2023  Patient seen for 4 sessions           Subjective Evaluation    History of Present Illness  Date of surgery: 2023  Mechanism of injury: Patient reports an approx 10 year history of R CTS.  On 23 she underwent a R CTR.  She is now 8 weeks s/p surgery and is referred to therapy for evaluation and treatment.    PMHx:  Arthritis, depression, anxiety, previous neck injury        Subjective comment: Reports that she is doing about the same.  Patient Occupation: homemaker, likes to quilt Pain  Current pain ratin    Social Support  Lives with: family.    Patient Goals  Patient goal: to have less pain and improved functional use of the R hand           Objective   See Exercise, Manual, and Modality Logs for complete treatment.       Assessment & Plan     Assessment    Assessment details: Good tolerance to exercises.  Continue with plan of care.        Visit Diagnoses:    ICD-10-CM ICD-9-CM   1. Weakness of right hand  R29.898 728.87   2. S/p bilateral carpal tunnel release  Z98.890 V45.89       Progress strengthening /stabilization /functional activity           Timed:  Manual Therapy:                 4     mins  19977  Therapeutic Exercise:      10     mins  12765     Neuromuscular reeducation       6    mins 04195  Therapeutic Activity              8     mins  19669  Ultrasound:                  0     mins  02001     Untimed:  Electrical Stimulation:    0     mins  00722 ( );  Fluidotherapy      0     mins  20579    Timed Treatment:   28   mins   Total Treatment:     28   mins    Jb Riggs OT, CHT  Occupational Therapist    Electronically signed      [place] license #: [id number]

## 2023-05-02 NOTE — PROGRESS NOTES
Physical Therapy Daily Treatment Note  [address], [address], [place] [address]      Patient: Tiffanie Peres   : 1970  Diagnosis/ICD-10 Code:  Sacro ilial pain [M53.3]  Referring practitioner: Dereje Tolbert MD  Date of Initial Visit: Type: THERAPY  Noted: 2022  Today's Date: 2023  Patient seen for 5 sessions             Subjective   Tiffanie Peres reports: 6/10 SI pain at beginning of session today.  Pt reports increased pain after nerve ablation last week.     Objective     See Exercise, Manual, and Modality Logs for complete treatment.       Assessment/Plan  Patient tolerated all exercise progressions well today but continues to demonstrate bilateral hip /core strength deficits limiting function.  Decreased PPT activation noted today compared to last session and pt continues to require intermittent cues.  Exercise progressions limited today due to reports of increased pain.  Continue to progress per patient tolerance.  Progress per Plan of Care           Timed:  Manual Therapy:    0     mins  71863;  Therapeutic Exercise:    22     mins  20789;     Neuromuscular [place]:    0    mins  61429;    Therapeutic Activity:     8     mins  71428;     Gait Trainin     mins  97164;     Ultrasound:     0     mins  49188;    Electrical Stimulation:    0     mins  04456;  Iontophoresis     0     mins  84246    Untimed:  Electrical Stimulation:    0     mins  21102 ( );  Mechanical Traction:    0     mins  82705;   Fluidotherapy     0     mins  81033  Hot pack     0     Mins    Cold pack                       0     Mins     Timed Treatment:   30   mins   Total Treatment:     30   mins        Lili Foster PT    Electronically signed [unfilled]  [place] License: [id number]  NPI number: [id number]

## 2023-05-17 ENCOUNTER — OFFICE VISIT (OUTPATIENT)
Dept: FAMILY MEDICINE CLINIC | Facility: CLINIC | Age: 53
End: 2023-05-17
Payer: OTHER GOVERNMENT

## 2023-05-17 ENCOUNTER — TELEPHONE (OUTPATIENT)
Dept: SURGERY | Facility: CLINIC | Age: 53
End: 2023-05-17
Payer: OTHER GOVERNMENT

## 2023-05-17 VITALS
DIASTOLIC BLOOD PRESSURE: 86 MMHG | HEIGHT: 65 IN | WEIGHT: 217.7 LBS | SYSTOLIC BLOOD PRESSURE: 132 MMHG | HEART RATE: 85 BPM | OXYGEN SATURATION: 100 % | RESPIRATION RATE: 16 BRPM | BODY MASS INDEX: 36.27 KG/M2 | TEMPERATURE: 96.8 F

## 2023-05-17 DIAGNOSIS — L05.91 PILONIDAL CYST WITHOUT ABSCESS: Primary | ICD-10-CM

## 2023-05-17 DIAGNOSIS — Z86.19 HISTORY OF COLD SORES: ICD-10-CM

## 2023-05-17 DIAGNOSIS — K12.0 APHTHOUS ULCER: ICD-10-CM

## 2023-05-17 RX ORDER — TRIAMCINOLONE ACETONIDE 0.1 %
PASTE (GRAM) DENTAL 2 TIMES DAILY
Qty: 5 G | Refills: 12 | Status: SHIPPED | OUTPATIENT
Start: 2023-05-17

## 2023-05-17 RX ORDER — CELECOXIB 50 MG/1
CAPSULE ORAL EVERY 24 HOURS
COMMUNITY

## 2023-05-17 RX ORDER — SULFAMETHOXAZOLE AND TRIMETHOPRIM 800; 160 MG/1; MG/1
1 TABLET ORAL 2 TIMES DAILY
Qty: 10 TABLET | Refills: 0 | Status: SHIPPED | OUTPATIENT
Start: 2023-05-17

## 2023-05-17 NOTE — PROGRESS NOTES
"Chief Complaint  Complaining of cyst on buttock    Subjective         Tiffanie Peres is a 52 y.o. female who presents to [facility]    52 years old comes to the clinic today for an acute visit.    Reports a cyst between buttock cheeks which popped open this morning.  Patient has noticed some serosanguineous fluids and some discomfort but denies any fever/trauma.  Patient never had this kind of symptoms before.  Denies any blood in stool or urine or any severe pain.    Patient also reports some history of cold sores and aphthous ulcers, currently she does not have it but has been getting them more often so she would like to get a cream or medication to help with symptoms.    Objective   Vital Signs:   Vitals:    05/17/23 1010   BP: 132/86   BP Location: Left arm   Patient Position: Sitting   Cuff Size: Large Adult   Pulse: 85   Resp: 16   Temp: 96.8 °F (36 °C)   TempSrc: Temporal   SpO2: 100%   Weight: 98.7 kg (217 lb 11.2 oz)   Height: 165.1 cm (65\")      Body mass index is 36.23 kg/m².   Wt Readings from Last 3 Encounters:   05/17/23 98.7 kg (217 lb 11.2 oz)   04/20/23 97.8 kg (215 lb 9.8 oz)   04/14/23 101 kg (223 lb)      BP Readings from Last 3 Encounters:   05/17/23 132/86   04/20/23 145/98   02/22/23 144/84        Patient Care Team:  Zo Vance MD as PCP - General (Family Medicine)  David Perry MD as Consulting Physician (General Surgery)     Physical Exam  Skin:            Comments: Small pilonidal cyst noted which is very superficial in location without any tracking, skin base was seen, healing well with mild serosanguineous fluid.  No underneath abscess/erythema or significant tenderness                       Assessment and Plan   Diagnoses and all orders for this visit:    1. Pilonidal cyst without abscess (Primary)  Comments:  Conservative management discussed; heating pad/sitz bath's, will start Bactrim and patient to follow-up if any worsening or no " improvement.  Orders:  -     sulfamethoxazole-trimethoprim (Bactrim DS) 800-160 MG per tablet; Take 1 tablet by mouth 2 (Two) Times a Day.  Dispense: 10 tablet; Refill: 0    2. History of cold sores  Comments:  May need further work-up and interventions if not controlled  Orders:  -     triamcinolone (KENALOG) 0.1 % paste; Apply  to teeth 2 (Two) Times a Day.  Dispense: 5 g; Refill: 12    3. Aphthous ulcer          Tobacco Use: Medium Risk   • Smoking Tobacco Use: Former   • Smokeless Tobacco Use: Never   • Passive Exposure: Not on file            Follow Up   Return if symptoms worsen or fail to improve.  Patient was given instructions and counseling regarding her condition or for health maintenance advice. Please see specific information pulled into the AVS if appropriate.

## 2023-06-06 ENCOUNTER — OFFICE VISIT (OUTPATIENT)
Dept: ORTHOPEDIC SURGERY | Facility: CLINIC | Age: 53
End: 2023-06-06
Payer: OTHER GOVERNMENT

## 2023-06-06 ENCOUNTER — OFFICE VISIT (OUTPATIENT)
Dept: SURGERY | Facility: CLINIC | Age: 53
End: 2023-06-06
Payer: OTHER GOVERNMENT

## 2023-06-06 VITALS
SYSTOLIC BLOOD PRESSURE: 132 MMHG | OXYGEN SATURATION: 97 % | BODY MASS INDEX: 36.15 KG/M2 | DIASTOLIC BLOOD PRESSURE: 88 MMHG | HEIGHT: 65 IN | HEART RATE: 72 BPM | WEIGHT: 217 LBS

## 2023-06-06 VITALS — WEIGHT: 213 LBS | HEART RATE: 77 BPM | BODY MASS INDEX: 35.49 KG/M2 | HEIGHT: 65 IN

## 2023-06-06 DIAGNOSIS — Z47.89 AFTERCARE FOLLOWING SURGERY OF THE MUSCULOSKELETAL SYSTEM: Primary | ICD-10-CM

## 2023-06-06 DIAGNOSIS — Z98.890 S/P COLONOSCOPY WITH POLYPECTOMY: Primary | ICD-10-CM

## 2023-06-06 DIAGNOSIS — K64.4 EXTERNAL HEMORRHOIDS: ICD-10-CM

## 2023-06-06 DIAGNOSIS — K63.5 HYPERPLASTIC POLYP OF ASCENDING COLON: ICD-10-CM

## 2023-06-06 RX ORDER — METHYLPREDNISOLONE 4 MG/1
TABLET ORAL
Qty: 21 TABLET | Refills: 0 | Status: SHIPPED | OUTPATIENT
Start: 2023-06-06

## 2023-06-06 NOTE — PROGRESS NOTES
"Chief Complaint  Follow-up of the Right Hand    Subjective      Tiffanie Peres presents to [facility] for follow-up of right carpal tunnel release performed on 2/22/2023 by Dr. Tolbert.  She presents today for follow-up reporting she has remained in outpatient OT through [facility].  She feels she is \"getting my strength back\".  She does report improvement in her preoperative carpal tunnel syndrome symptoms with improvement in  strength and resolution of numbness and pain in her second through fifth fingers.  She is complaining of pain in the palmar distribution and numbness/tingling sensation radiating up her thumb.    Objective   Allergies   Allergen Reactions    Naproxen Rash       Vital Signs:   /88 (BP Location: Left arm, Patient Position: Sitting)   Pulse 72   Ht 165.1 cm (65\")   Wt 98.4 kg (217 lb)   SpO2 97%   BMI 36.11 kg/m²       Physical Exam    Constitutional: Awake, alert. Well nourished appearance.    Integumentary: Warm, dry, intact. No obvious rashes.    HENT: Atraumatic, normocephalic.   Respiratory: Non labored respirations .   Cardiovascular: Intact peripheral pulses.    Psychiatric: Normal mood and affect. A&O X3    Ortho Exam  Right wrist: Well-healed surgical scar noted at the palmar aspect of the patient's right wrist.  Patient is able to form a full fist.  Good thumb opposition.  Patient reports decreased sensation to her thumb.  Full flexion and extension of the wrist.  Full pronation supination.    Imaging Results (Most Recent)       None                      Assessment and Plan   Problem List Items Addressed This Visit          Musculoskeletal and Injuries    Aftercare following R CTR - Primary    Relevant Orders    Ambulatory Referral to Occupational Therapy       Follow Up   Return in about 6 weeks (around 7/18/2023).    Tobacco Use: Medium Risk    Smoking Tobacco Use: Former    Smokeless Tobacco Use: Never    Passive Exposure: Not on " file       Patient is a former smoker.  Encouraged continued tobacco cessation.  Did not discuss options for smoking cessation.    Patient Instructions   Patient is improving with OT. Updated OT order placed. Continue home exercises. Rx for trial of medrol dosepak sent to pharmacy.     Follow up in 6 weeks. Call with changes or concerns.   Patient was given instructions and counseling regarding her condition or for health maintenance advice. Please see specific information pulled into the AVS if appropriate.

## 2023-06-06 NOTE — PATIENT INSTRUCTIONS
Patient is improving with OT. Updated OT order placed. Continue home exercises. Rx for trial of medrol dosepak sent to pharmacy.     Follow up in 6 weeks. Call with changes or concerns.

## 2023-06-06 NOTE — PROGRESS NOTES
"Chief Complaint: Post-op Follow-up    Subjective      Follow-up visit       History of Present Illness  Tiffanie Peres is a 52 y.o. female presents to [facility] for follow-up visit.    Patient presents today for follow-up visit after undergoing a colonoscopy on 4/20/2023 performed by Dr. David Perry.    Patient was with a hyperplastic polyp of the ascending colon per colonoscopy/pathology.  Resection and retrieval of this polyp was complete.    Patient was with an external hemorrhoid noted during perianal exam.    Results for orders placed or performed during the hospital encounter of 04/20/23   Tissue Pathology Exam    Specimen: Large Intestine, Right / Ascending Colon; Polyp   Result Value Ref Range    Case Report       Surgical Pathology Report                         Case: [id number]                                  Authorizing Provider:  David Perry MD      Collected:           04/20/2023 04:02 PM          Ordering Location:     Psychiatric Received:            04/21/2023 07:41 AM                                 SUITES                                                                       Pathologist:           Milton Garcia MD                                                            Specimen:    Large Intestine, Right / Ascending Colon, ASCENDING COLON POLYP                            Clinical Information       Screening due      Final Diagnosis       Ascending colon polyp, biopsy:   - Hyperplastic polyp        Gross Description       1. Large Intestine, Right / Ascending Colon.  Received in formalin and labeled \" ascending colon polyp\" is a 0.8 cm fragment of tan soft tissue. The specimen is entirely submitted in one cassette.   CAITY      Microscopic Description       Colonoscopy was performed without difficulty.  The patient tolerated the procedure well.    Patient denies any postoperative complications.  Objective     Past Medical History: "   Diagnosis Date    Allergic     Anxiety 1988    Arthritis 2010    Carpal tunnel syndrome on both sides     CTS (carpal tunnel syndrome) 2012    Depression 1988    Factor 5 Leiden mutation, heterozygous     ASYMPTOMATIC/NO BLOOD THINNERS    HL (hearing loss) 2017    right    Hypertension     Low back pain 2009    Low back strain     Multiple lipomas     Neck strain     Pulmonary embolism 10/31/2001    NONE SINCE       Past Surgical History:   Procedure Laterality Date    BIOPSY OF LEG Right     BREAST BIOPSY  2016    Right side    CARPAL TUNNEL RELEASE Right 02/22/2023    Procedure: CARPAL TUNNEL RELEASE;  Surgeon: Dereje Tolbert MD;  Location: [facility] OR [facility];  Service: Orthopedics;  Laterality: Right;    COLONOSCOPY N/A 4/20/2023    Procedure: COLONOSCOPY WITH POLYPECTOMY;  Surgeon: David Perry MD;  Location: [facility] ENDOSCOPY;  Service: General;  Laterality: N/A;  HEMORRHOIDS, COLON POLYPS    EXCISION LESION Bilateral 11/14/2022    Procedure: EXCISION MULTIPLE LIPOMAS UPPER LIPOMAS BILATERAL ARMS;  Surgeon: David Perry MD;  Location: [facility] MAIN OR;  Service: General;  Laterality: Bilateral;    KNEE SURGERY Bilateral 2009    LIPOMA EXCISION      OTHER SURGICAL HISTORY  2010    rfa si joint lower back    SUBTOTAL HYSTERECTOMY  2007    Still have cervix    TUBAL ABDOMINAL LIGATION  1995       Outpatient Medications Marked as Taking for the 6/6/23 encounter (Office Visit) with Margarito April, APRN   Medication Sig Dispense Refill    celecoxib (CeleBREX) 50 MG capsule Daily.      cholecalciferol (VITAMIN D3) 1.25 MG (43972 UT) capsule cholecalciferol (vitamin D3) 1,250 mcg (50,000 unit) capsule   take one capsule by mouth once weekly      clotrimazole (LOTRIMIN) 1 % cream       DULoxetine (CYMBALTA) 30 MG capsule Take 1 capsule by mouth Daily. 90 capsule 1    hydrocortisone 1 % cream       IODINE-VITAMIN A PO Take 1 tablet by mouth Daily.      losartan-hydrochlorothiazide (HYZAAR) 50-12.5 MG per tablet  "Take 1 tablet by mouth Daily. 90 tablet 1    methylPREDNISolone (MEDROL) 4 MG dose pack Use as directed by package instructions 21 tablet 0    multivitamin with minerals tablet tablet Take 1 tablet by mouth Daily.      NON FORMULARY BIOTE ESTROGEN/TESTERONE PELLETS TIME RELEASED, INSERTED UNDER SKIN Q3-4MONTHS      Saxenda 18 MG/3ML injection pen       triamcinolone (KENALOG) 0.1 % paste Apply  to teeth 2 (Two) Times a Day. 5 g 12       Allergies   Allergen Reactions    Naproxen Rash        Family History   Problem Relation Age of Onset    Anxiety disorder Mother     Arthritis Mother     Depression Mother     Heart disease Mother     Hyperlipidemia Mother     Clotting disorder Mother     Osteoporosis Mother     Rheumatologic disease Mother     Heart disease Father     Hyperlipidemia Father     Anxiety disorder Sister     Depression Sister     Alcohol abuse Brother     Drug abuse Brother     Anxiety disorder Son     Arthritis Maternal Grandmother     Cancer Maternal Grandmother     Diabetes Maternal Grandmother     Vision loss Maternal Grandmother     Malig Hyperthermia Neg Hx        Social History     Socioeconomic History    Marital status:    Tobacco Use    Smoking status: Former     Packs/day: 1.00     Years: 18.00     Pack years: 18.00     Types: Cigarettes     Quit date: 10/20/2001     Years since quittin.6    Smokeless tobacco: Never   Vaping Use    Vaping Use: Never used   Substance and Sexual Activity    Alcohol use: Yes     Comment: One beer every few months    Drug use: Never    Sexual activity: Defer       Review of Systems   Constitutional:  Negative for chills and fever.   HENT:  Negative for trouble swallowing.    Gastrointestinal:  Negative for abdominal distention, abdominal pain, anal bleeding, blood in stool, constipation, diarrhea and rectal pain.      Vital Signs:   Pulse 77   Ht 165.1 cm (65\")   Wt 96.6 kg (213 lb)   BMI 35.45 kg/m²      Physical Exam  Vitals and nursing note " reviewed.   Constitutional:       General: She is not in acute distress.     Appearance: Normal appearance.   HENT:      Head: Normocephalic.   Cardiovascular:      Rate and Rhythm: Normal rate.   Pulmonary:      Effort: Pulmonary effort is normal.      Breath sounds: No stridor.   Abdominal:      Palpations: Abdomen is soft.      Tenderness: There is no guarding.   Musculoskeletal:         General: No deformity. Normal range of motion.   Skin:     General: Skin is warm and dry.      Coloration: Skin is not jaundiced.   Neurological:      General: No focal deficit present.      Mental Status: She is alert and oriented to person, place, and time.   Psychiatric:         Mood and Affect: Mood normal.         Thought Content: Thought content normal.        Result Review :          []  Laboratory  []  Radiology  []  Pathology  []  Microbiology  []  EKG/Telemetry   []  Cardiology/Vascular   []  Old records  Today I reviewed Dr. Perry's operative & pathology report.     Assessment and Plan    Diagnoses and all orders for this visit:    1. S/P colonoscopy with polypectomy (Primary)    2. Hyperplastic polyp of ascending colon    3. External hemorrhoids        Follow Up   Return for Rescreen colon in 5 years; follow-up in interim as needed.    Increase fiber to reduce straining and constipation  Increase water intake  Ensure proper cleaning after BMs  Use witch hazel/Tucks for comfort and reduce swelling  If no improvement surgical intervention may be required    Avoid high fat diets  Increase fiber intake    Per AGA guidelines patient will follow-up for colonoscopy surveillance as directed    Patient was given instructions and counseling regarding her condition or for health maintenance advice. Please see specific information pulled into the AVS if appropriate.

## 2023-06-13 ENCOUNTER — TELEPHONE (OUTPATIENT)
Dept: PHYSICAL THERAPY | Facility: OTHER | Age: 53
End: 2023-06-13
Payer: OTHER GOVERNMENT

## 2023-06-13 NOTE — TELEPHONE ENCOUNTER
Caller: BLAIR COATES    Relationship: Emergency Contact    What was the call regarding: PATIENT CANCELLED APPT TODAY DUE TO NOT FEELING WELL

## 2023-06-29 PROBLEM — R22.9 SKIN MASS: Status: ACTIVE | Noted: 2023-06-29

## 2023-07-25 ENCOUNTER — OFFICE VISIT (OUTPATIENT)
Dept: ORTHOPEDIC SURGERY | Facility: CLINIC | Age: 53
End: 2023-07-25
Payer: OTHER GOVERNMENT

## 2023-07-25 VITALS
HEART RATE: 90 BPM | SYSTOLIC BLOOD PRESSURE: 149 MMHG | OXYGEN SATURATION: 97 % | WEIGHT: 216.05 LBS | DIASTOLIC BLOOD PRESSURE: 93 MMHG | HEIGHT: 65 IN | BODY MASS INDEX: 36 KG/M2

## 2023-07-25 DIAGNOSIS — Z47.89 AFTERCARE FOLLOWING SURGERY OF THE MUSCULOSKELETAL SYSTEM: Primary | ICD-10-CM

## 2023-07-25 PROCEDURE — 99213 OFFICE O/P EST LOW 20 MIN: CPT | Performed by: PHYSICIAN ASSISTANT

## 2023-07-25 NOTE — PROGRESS NOTES
"Chief Complaint  Pain and Follow-up of the Right Wrist    Subjective      Tiffanie Peres presents to [facility] for follow-up of right carpal tunnel release performed on 2/22/2023 by Dr. Tolbert.  Patient presents today for follow-up reporting that she is \"not doing 1 bit better\".  She reports that she has been waking up at night with a \"pulsing sensation\" in her hands.  She has been using carpal tunnel brace nightly, stating that she is unable to sleep without this.  She reports that preoperative numbness/tingling sensation has resolved with the exception of presence in her thumb and palmar aspect.  She reports a \"pinprick sensation\" to the distal aspect of her fingers.  She has not returned to occupational therapy since her last office appointment, although reports she has continued exercises at home.  She has tried TENS unit and trial of Medrol Dosepak with minimal and temporary relief of her symptoms.     Objective   Allergies   Allergen Reactions    Naproxen Rash       Vital Signs:   /93   Pulse 90   Ht 165.1 cm (65\")   Wt 98 kg (216 lb 0.8 oz)   SpO2 97%   BMI 35.95 kg/m²       Physical Exam    Constitutional: Awake, alert. Well nourished appearance.    Integumentary: Warm, dry, intact. No obvious rashes.    HENT: Atraumatic, normocephalic.   Respiratory: Non labored respirations .   Cardiovascular: Intact peripheral pulses.    Psychiatric: Normal mood and affect. A&O X3    Ortho Exam  Right wrist: Skin is warm, dry, and intact.  Well-healed surgical scar noted to the palmar aspect of the patient's right wrist.  Patient is able to form a full fist.  Good thumb opposition.  Sensation intact light touch.  Distal neurovascular intact.  Full wrist ROM in all planes.    Imaging Results (Most Recent)       None                    Assessment and Plan   Problem List Items Addressed This Visit          Musculoskeletal and Injuries    Aftercare following R CTR - Primary "     Follow Up   Return if symptoms worsen or fail to improve.    Tobacco Use: Medium Risk    Smoking Tobacco Use: Former    Smokeless Tobacco Use: Never    Passive Exposure: Not on file     Patient is a former smoker.  Encouraged continued tobacco cessation.  Did not discuss options for smoking cessation.    Patient Instructions   Patient can discontinue carpal tunnel brace. Patient elected to continue home exercises and will call should they decide to pursue therapy. Gradually return to activity as tolerated.     Follow up as needed. Call with changes or concerns.     Patient was given instructions and counseling regarding her condition or for health maintenance advice. Please see specific information pulled into the AVS if appropriate.

## 2023-07-25 NOTE — PATIENT INSTRUCTIONS
Patient can discontinue carpal tunnel brace. Patient elected to continue home exercises and will call should they decide to pursue therapy. Gradually return to activity as tolerated.     Follow up as needed. Call with changes or concerns.

## 2023-08-18 ENCOUNTER — DOCUMENTATION (OUTPATIENT)
Dept: PHYSICAL THERAPY | Facility: CLINIC | Age: 53
End: 2023-08-18
Payer: OTHER GOVERNMENT

## 2023-08-18 NOTE — PROGRESS NOTES
Pt discharged due to non-compliance.  See last progress note for last objective measurements and progress towards goals.

## 2023-09-26 ENCOUNTER — TELEPHONE (OUTPATIENT)
Dept: SURGERY | Facility: CLINIC | Age: 53
End: 2023-09-26
Payer: OTHER GOVERNMENT

## 2023-11-24 DIAGNOSIS — M54.50 CHRONIC BILATERAL LOW BACK PAIN WITHOUT SCIATICA: ICD-10-CM

## 2023-11-24 DIAGNOSIS — R63.2 BINGE EATING: ICD-10-CM

## 2023-11-24 DIAGNOSIS — F41.9 ANXIETY AND DEPRESSION: ICD-10-CM

## 2023-11-24 DIAGNOSIS — G89.29 CHRONIC BILATERAL LOW BACK PAIN WITHOUT SCIATICA: ICD-10-CM

## 2023-11-24 DIAGNOSIS — F32.A ANXIETY AND DEPRESSION: ICD-10-CM

## 2023-11-27 DIAGNOSIS — I10 PRIMARY HYPERTENSION: ICD-10-CM

## 2023-11-27 RX ORDER — LOSARTAN POTASSIUM AND HYDROCHLOROTHIAZIDE 12.5; 5 MG/1; MG/1
1 TABLET ORAL DAILY
Qty: 90 TABLET | Refills: 3 | Status: SHIPPED | OUTPATIENT
Start: 2023-11-27

## 2023-11-27 RX ORDER — DULOXETIN HYDROCHLORIDE 30 MG/1
30 CAPSULE, DELAYED RELEASE ORAL DAILY
Qty: 90 CAPSULE | Refills: 3 | Status: SHIPPED | OUTPATIENT
Start: 2023-11-27

## 2023-12-15 ENCOUNTER — TELEPHONE (OUTPATIENT)
Dept: SURGERY | Facility: CLINIC | Age: 53
End: 2023-12-15
Payer: OTHER GOVERNMENT

## 2023-12-15 NOTE — TELEPHONE ENCOUNTER
Caller: SERGEI COATES    Relationship: SELF    Best call back number: [phone]     What is the best time to reach you: ANYTIME    Who are you requesting to speak with (clinical staff, provider,  specific staff member): SURG  FOR ASHLEY    Do you know the name of the person who called: VALEIRE    What was the call regarding: PT IS NEEDING TO RESCHEDULE THE SURGERY SET FOR 1/22/24 - SHE THINKS AROUND 2/12/24 IS GOOD    Is it okay if the provider responds through MyChart: YES - CAN ALSO DETAILED VM W/ DATE AND TIME

## 2024-02-05 ENCOUNTER — TELEPHONE (OUTPATIENT)
Dept: SURGERY | Facility: CLINIC | Age: 54
End: 2024-02-05
Payer: OTHER GOVERNMENT

## 2024-02-05 NOTE — TELEPHONE ENCOUNTER
SPOKE WITH THE PATIENT AND LET HER KNOW THAT SINCE IT HAS BEEN 6 MONTH'S SINCE SHE WAS LAST SEEN THAT SHE WOULD HAVE TO COME BACK IN FOR AN APPOINTMENT. PATIENT STATES UNDERSTANDING.

## 2024-02-07 ENCOUNTER — TRANSCRIBE ORDERS (OUTPATIENT)
Dept: ADMINISTRATIVE | Facility: HOSPITAL | Age: 54
End: 2024-02-07
Payer: OTHER GOVERNMENT

## 2024-02-07 DIAGNOSIS — Z12.31 ENCOUNTER FOR SCREENING MAMMOGRAM FOR BREAST CANCER: Primary | ICD-10-CM

## 2024-04-16 ENCOUNTER — HOSPITAL ENCOUNTER (OUTPATIENT)
Dept: MAMMOGRAPHY | Facility: HOSPITAL | Age: 54
Discharge: HOME | End: 2024-04-16
Admitting: STUDENT IN AN ORGANIZED HEALTH CARE EDUCATION/TRAINING PROGRAM
Payer: OTHER GOVERNMENT

## 2024-04-16 DIAGNOSIS — Z12.31 ENCOUNTER FOR SCREENING MAMMOGRAM FOR BREAST CANCER: ICD-10-CM

## 2024-04-16 PROCEDURE — 77063 BREAST TOMOSYNTHESIS BI: CPT

## 2024-04-16 PROCEDURE — 77067 SCR MAMMO BI INCL CAD: CPT

## 2024-06-18 ENCOUNTER — TELEPHONE (OUTPATIENT)
Dept: ORTHOPEDIC SURGERY | Facility: CLINIC | Age: 54
End: 2024-06-18
Payer: OTHER GOVERNMENT

## 2024-06-18 NOTE — TELEPHONE ENCOUNTER
Caller: Tiffanie Peres    Relationship to patient: Self    Best call back number:     Chief complaint: RIGHT HAND     Type of visit: FUP CARPAL TUNNEL     Requested date: 6/28/24 EARLY      Additional notes:WOULD LIKE TO COME THAT DAY IF POSSIBLE

## 2024-07-02 ENCOUNTER — OFFICE VISIT (OUTPATIENT)
Dept: ORTHOPEDIC SURGERY | Facility: CLINIC | Age: 54
End: 2024-07-02
Payer: OTHER GOVERNMENT

## 2024-07-02 ENCOUNTER — PREP FOR SURGERY (OUTPATIENT)
Dept: OTHER | Facility: HOSPITAL | Age: 54
End: 2024-07-02
Payer: OTHER GOVERNMENT

## 2024-07-02 VITALS
HEART RATE: 73 BPM | BODY MASS INDEX: 37.49 KG/M2 | DIASTOLIC BLOOD PRESSURE: 94 MMHG | OXYGEN SATURATION: 98 % | HEIGHT: 65 IN | SYSTOLIC BLOOD PRESSURE: 143 MMHG | WEIGHT: 225 LBS

## 2024-07-02 DIAGNOSIS — G56.01 CARPAL TUNNEL SYNDROME ON RIGHT: Primary | ICD-10-CM

## 2024-07-02 DIAGNOSIS — G56.01 CARPAL TUNNEL SYNDROME ON RIGHT: ICD-10-CM

## 2024-07-02 DIAGNOSIS — M79.641 RIGHT HAND PAIN: Primary | ICD-10-CM

## 2024-07-02 NOTE — PROGRESS NOTES
"Chief Complaint  Follow-up and Pain of the Right Hand     Subjective      Tiffanie Peres presents to [facility] for follow-up of right carpal tunnel release performed on 2023. She is still having continued numbness and tingling.  She was diagnosed in  with carpal tunnel and still having symptoms.  She feels like she is getting \"shocked.\"  She feels continued pins and needles always in the thumb and the 2 nd and 3 rd digit.  She drops things and has decrease FMC strength.  She notes the numbness and tingling wake her up at night.     Allergies   Allergen Reactions    Naproxen Rash        Social History     Socioeconomic History    Marital status:    Tobacco Use    Smoking status: Former     Current packs/day: 0.00     Average packs/day: 1 pack/day for 18.0 years (18.0 ttl pk-yrs)     Types: Cigarettes     Start date: 10/20/1983     Quit date: 10/20/2001     Years since quittin.7    Smokeless tobacco: Never   Vaping Use    Vaping status: Never Used   Substance and Sexual Activity    Alcohol use: Yes     Comment: One beer every few months    Drug use: Never    Sexual activity: Defer        I reviewed the patient's chief complaint, history of present illness, review of systems, past medical history, surgical history, family history, social history, medications, and allergy list.     Review of Systems     Constitutional: Denies fevers, chills, weight loss  Cardiovascular: Denies chest pain, shortness of breath  Skin: Denies rashes, acute skin changes  Neurologic: Denies headache, loss of consciousness  MSK: Right hand pain.       Vital Signs:   /94   Pulse 73   Ht 165.1 cm (65\")   Wt 102 kg (225 lb)   SpO2 98%   BMI 37.44 kg/m²          Physical Exam  General: Alert. No acute distress    Ortho Exam        RIGHT HAND Positive Compression testing/ Positive Tinels. NegativeFinkelsteins. Negative Macias's testing. Negative CMC grind testing. Positive Phalens. " Full ROM of the hand, fingers, elbow and wrist. Negative Triggering of the digit. Sensation grossly intact to light touch, median, radial and ulnar nerve. Positive AIN, PIN and ulnar nerve motor function intact. Axillary nerve intact. Positive pulses.        Procedures      Imaging Results (Most Recent)       None             Result Review :          Assessment and Plan     Diagnoses and all orders for this visit:    1. Right hand pain (Primary)    2. Carpal tunnel syndrome on right        Discussed the treatment plan with the patient.     Discussed the treatment options with the patient, operative vs non-operative.   The patient expressed understanding and wished to proceed with a right carpal tunnel release.       Discussed surgery., Risks/benefits discussed with patient including, but not limited to: infection, bleeding, neurovascular damage, re-rupture, aesthetic deformity, need for further surgery, and death., Surgery pamphlet given., and Call or return if worsening symptoms.    Follow Up     2 weeks postoperatively     Patient was given instructions and counseling regarding her condition or for health maintenance advice. Please see specific information pulled into the AVS if appropriate.     Scribed for Dereje Tolbert MD by Kim Monroe MA.  07/02/24   08:18 EDT    I have personally performed the services described in this document as scribed by the above individual and it is both accurate and complete. Dereje Tolbert MD 07/03/24

## 2024-07-31 ENCOUNTER — OFFICE VISIT (OUTPATIENT)
Dept: FAMILY MEDICINE CLINIC | Facility: CLINIC | Age: 54
End: 2024-07-31
Payer: OTHER GOVERNMENT

## 2024-07-31 VITALS
WEIGHT: 229 LBS | OXYGEN SATURATION: 95 % | HEART RATE: 94 BPM | BODY MASS INDEX: 38.15 KG/M2 | RESPIRATION RATE: 16 BRPM | HEIGHT: 65 IN | DIASTOLIC BLOOD PRESSURE: 80 MMHG | TEMPERATURE: 97.3 F | SYSTOLIC BLOOD PRESSURE: 122 MMHG

## 2024-07-31 DIAGNOSIS — F40.228 AEROPHOBIA: ICD-10-CM

## 2024-07-31 DIAGNOSIS — F32.A ANXIETY AND DEPRESSION: ICD-10-CM

## 2024-07-31 DIAGNOSIS — R06.81 APNEA: ICD-10-CM

## 2024-07-31 DIAGNOSIS — I10 PRIMARY HYPERTENSION: Primary | ICD-10-CM

## 2024-07-31 DIAGNOSIS — F41.9 ANXIETY AND DEPRESSION: ICD-10-CM

## 2024-07-31 DIAGNOSIS — Z11.59 NEED FOR HEPATITIS C SCREENING TEST: ICD-10-CM

## 2024-07-31 DIAGNOSIS — E66.9 OBESITY (BMI 30-39.9): ICD-10-CM

## 2024-07-31 PROCEDURE — 99214 OFFICE O/P EST MOD 30 MIN: CPT | Performed by: STUDENT IN AN ORGANIZED HEALTH CARE EDUCATION/TRAINING PROGRAM

## 2024-07-31 RX ORDER — ALPRAZOLAM 0.5 MG/1
0.5 TABLET ORAL 2 TIMES DAILY PRN
Qty: 5 TABLET | Refills: 0 | Status: SHIPPED | OUTPATIENT
Start: 2024-07-31

## 2024-07-31 RX ORDER — DULOXETIN HYDROCHLORIDE 60 MG/1
60 CAPSULE, DELAYED RELEASE ORAL DAILY
Qty: 90 CAPSULE | Refills: 1 | Status: SHIPPED | OUTPATIENT
Start: 2024-07-31 | End: 2024-07-31

## 2024-07-31 RX ORDER — DULOXETIN HYDROCHLORIDE 60 MG/1
60 CAPSULE, DELAYED RELEASE ORAL DAILY
Qty: 90 CAPSULE | Refills: 1 | Status: SHIPPED | OUTPATIENT
Start: 2024-07-31

## 2024-07-31 NOTE — PROGRESS NOTES
"Chief Complaint  Anxiety, obesity    Subjective         Tiffanie Peres is a 53 y.o. female who presents to [facility]    53 years old comes to the clinic today to follow-up after more than 1 year.    Hypertension is stable on losartan/hydrochlorothiazide.    Anxiety; uncontrolled, would like to increase Cymbalta.    Patient is going on a vacation with her , would like to get Xanax for agoraphobia.  Patient has total of 4 flights for their vacation.    Patient never finished her workup for obstructive sleep apnea and would like to get referral again.    12+ review of systems are unremarkable otherwise.    Objective   Vital Signs:   Vitals:    07/31/24 0735   BP: 122/80   Pulse: 94   Resp: 16   Temp: 97.3 °F (36.3 °C)   SpO2: 95%   Weight: 104 kg (229 lb)   Height: 165.1 cm (65\")      Body mass index is 38.11 kg/m².   Wt Readings from Last 3 Encounters:   07/31/24 104 kg (229 lb)   07/02/24 102 kg (225 lb)   07/25/23 98 kg (216 lb 0.8 oz)      BP Readings from Last 3 Encounters:   07/31/24 122/80   07/02/24 143/94   07/25/23 149/93        Patient Care Team:  Zo Vance MD as PCP - General (Family Medicine)  David Perry MD as Consulting Physician (General Surgery)     Physical Exam  Vitals reviewed.   Constitutional:       Appearance: Normal appearance. She is well-developed.   HENT:      Head: Normocephalic and atraumatic.      Right Ear: External ear normal.      Left Ear: External ear normal.      Mouth/Throat:      Pharynx: No oropharyngeal exudate.   Eyes:      Conjunctiva/sclera: Conjunctivae normal.      Pupils: Pupils are equal, round, and reactive to light.   Cardiovascular:      Rate and Rhythm: Normal rate and regular rhythm.      Heart sounds: No murmur heard.     No friction rub. No gallop.   Pulmonary:      Effort: Pulmonary effort is normal.      Breath sounds: Normal breath sounds. No wheezing or rhonchi.   Abdominal:      General: Bowel sounds are " normal. There is no distension.      Palpations: Abdomen is soft.      Tenderness: There is no abdominal tenderness.   Skin:     General: Skin is warm and dry.   Neurological:      Mental Status: She is alert and oriented to person, place, and time.      Cranial Nerves: No cranial nerve deficit.   Psychiatric:         Mood and Affect: Mood and affect normal.         Behavior: Behavior normal.         Thought Content: Thought content normal.         Judgment: Judgment normal.            Class 2 Severe Obesity (BMI >=35 and <=39.9). Obesity-related health conditions include the following: obstructive sleep apnea, hypertension, and coronary heart disease. Obesity is unchanged. BMI is is above average; BMI management plan is completed. We discussed portion control and increasing exercise.          Assessment and Plan   Diagnoses and all orders for this visit:    1. Primary hypertension (Primary)  Comments:  Chronic, controlled on losartan/hydrochlorothiazide.  DASH diet recommended  Orders:  -     TSH Rfx On Abnormal To Free T4; Future  -     CBC & Differential; Future  -     Comprehensive Metabolic Panel; Future  -     Hemoglobin A1c; Future  -     Lipid Panel; Future  -     Urinalysis With Microscopic - Urine, Clean Catch; Future    2. Anxiety and depression  Comments:  Mildly uncontrolled, will increase Cymbalta from 30 to 60.  Orders:  -     Discontinue: DULoxetine (CYMBALTA) 60 MG capsule; Take 1 capsule by mouth Daily.  Dispense: 90 capsule; Refill: 1  -     TSH Rfx On Abnormal To Free T4; Future  -     CBC & Differential; Future  -     Comprehensive Metabolic Panel; Future  -     Hemoglobin A1c; Future  -     Lipid Panel; Future  -     Urinalysis With Microscopic - Urine, Clean Catch; Future  -     DULoxetine (CYMBALTA) 60 MG capsule; Take 1 capsule by mouth Daily.  Dispense: 90 capsule; Refill: 1    3. Obesity (BMI 30-39.9)  Comments:  Daily exercise and healthy diet recommended  Orders:  -     Ambulatory  Referral to Sleep Medicine  -     TSH Rfx On Abnormal To Free T4; Future  -     CBC & Differential; Future  -     Comprehensive Metabolic Panel; Future  -     Hemoglobin A1c; Future  -     Lipid Panel; Future  -     Urinalysis With Microscopic - Urine, Clean Catch; Future    4. Aerophobia  Comments:  Xanax sent, educated on medication.  No UDS/contract needed  Orders:  -     ALPRAZolam (Xanax) 0.5 MG tablet; Take 1 tablet by mouth 2 (Two) Times a Day As Needed for Anxiety.  Dispense: 5 tablet; Refill: 0  -     TSH Rfx On Abnormal To Free T4; Future  -     CBC & Differential; Future  -     Comprehensive Metabolic Panel; Future  -     Hemoglobin A1c; Future  -     Lipid Panel; Future  -     Urinalysis With Microscopic - Urine, Clean Catch; Future    5. Apnea  -     Ambulatory Referral to Sleep Medicine  -     TSH Rfx On Abnormal To Free T4; Future  -     CBC & Differential; Future  -     Comprehensive Metabolic Panel; Future  -     Hemoglobin A1c; Future  -     Lipid Panel; Future  -     Urinalysis With Microscopic - Urine, Clean Catch; Future    6. Annual physical exam  -     TSH Rfx On Abnormal To Free T4; Future  -     CBC & Differential; Future  -     Comprehensive Metabolic Panel; Future  -     Hemoglobin A1c; Future  -     Lipid Panel; Future  -     Urinalysis With Microscopic - Urine, Clean Catch; Future    7. Need for hepatitis C screening test  -     Hepatitis C Antibody; Future          Tobacco Use: Medium Risk (7/31/2024)    Patient History     Smoking Tobacco Use: Former     Smokeless Tobacco Use: Never     Passive Exposure: Not on file            Follow Up   Return in about 3 months (around 10/31/2024) for Annual physical and PAP.  Patient was given instructions and counseling regarding her condition or for health maintenance advice. Please see specific information pulled into the AVS if appropriate.

## 2024-08-14 ENCOUNTER — OFFICE VISIT (OUTPATIENT)
Dept: SLEEP MEDICINE | Facility: HOSPITAL | Age: 54
End: 2024-08-14
Payer: OTHER GOVERNMENT

## 2024-08-14 VITALS
BODY MASS INDEX: 37.82 KG/M2 | SYSTOLIC BLOOD PRESSURE: 143 MMHG | WEIGHT: 227 LBS | HEIGHT: 65 IN | DIASTOLIC BLOOD PRESSURE: 84 MMHG | OXYGEN SATURATION: 98 % | HEART RATE: 61 BPM

## 2024-08-14 DIAGNOSIS — G25.81 RESTLESS LEGS SYNDROME (RLS): ICD-10-CM

## 2024-08-14 DIAGNOSIS — D68.51 FACTOR V LEIDEN: ICD-10-CM

## 2024-08-14 DIAGNOSIS — R06.81 WITNESSED APNEIC SPELLS: ICD-10-CM

## 2024-08-14 DIAGNOSIS — R29.818 SUSPECTED SLEEP APNEA: Primary | ICD-10-CM

## 2024-08-14 DIAGNOSIS — G47.10 HYPERSOMNIA: ICD-10-CM

## 2024-08-14 DIAGNOSIS — E66.9 OBESITY (BMI 30-39.9): ICD-10-CM

## 2024-08-14 PROBLEM — G47.63 SLEEP-RELATED BRUXISM: Status: ACTIVE | Noted: 2024-08-14

## 2024-08-14 PROBLEM — R06.83 SNORING: Status: ACTIVE | Noted: 2024-08-14

## 2024-08-14 PROCEDURE — G0463 HOSPITAL OUTPT CLINIC VISIT: HCPCS

## 2024-08-14 NOTE — PROGRESS NOTES
Sleep Consultation    Patient Name: Tiffanie Peres  Age/Sex: 53 y.o. female  : 1970  MRN: [id number]    Date of Encounter Visit: 2024  Encounter Provider: Denis Junior MD  Referring Provider: Zo Vance MD  Place of Service: [facility]  Patient Care Team:  Zo Vance MD as PCP - General (Family Medicine)  David Perry MD as Consulting Physician (General Surgery)    Subjective:     Reason for Consult: evaluation for ALISON    History of Present Illness:  Tiffanie Peres is a 53 y.o. female is here for evaluation of ALISON due to suggestive symptoms .    Patient complains of daytime fatigue and sleepiness with an [place] Sleepiness Scale (ESS) of 11.  Patient complains of snoring in all position with witnessed apnea  He does have some leg discomfort at night with possible restless leg syndrome  Patient denies any cataplexy, sleep paralysis or other symptoms to suggest narcolepsy.  Patient denies any parasomnias.  Denies any history of seizure disorder or recent head trauma.  Patient spends adequate amount of time in bed with no evidence of sleep restriction or improper sleep hygiene.  Bedtime is around 7:30, wake up time is 3:30 in the morning, sleep onset can be up to 2 hours and patient wakes up feeling exhausted  Comorbidities include: HTN, anxiety, Obesity , factor V Leiden heterozygous mutation     Review of Systems:   A twelve-system review was conducted and was negative except for the following: Frequent urination, neck pain, fatigue, swelling in the ankles, shortness of breath, anxiety and depression and heat intolerance.        Past Medical History:  Past Medical History:   Diagnosis Date    Allergic     Anxiety     Arthritis 2010    Carpal tunnel syndrome on both sides     CTS (carpal tunnel syndrome) 2012    Depression 1988    Factor 5 Leiden mutation, heterozygous     ASYMPTOMATIC/NO BLOOD THINNERS    HL (hearing loss) 2017    right    Hypertension      Low back pain 2009    Low back strain     Multiple lipomas     Neck strain     Pulmonary embolism 10/31/2001    NONE SINCE       Past Surgical History:   Procedure Laterality Date    BIOPSY OF LEG Right     BREAST BIOPSY  2016    Right side    CARPAL TUNNEL RELEASE Right 02/22/2023    Procedure: CARPAL TUNNEL RELEASE;  Surgeon: Dereje Tolbert MD;  Location: [facility] OR [facility];  Service: Orthopedics;  Laterality: Right;    COLONOSCOPY N/A 04/20/2023    Procedure: COLONOSCOPY WITH POLYPECTOMY;  Surgeon: David Perry MD;  Location: [facility] ENDOSCOPY;  Service: General;  Laterality: N/A;  HEMORRHOIDS, COLON POLYPS    EXCISION LESION Bilateral 11/14/2022    Procedure: EXCISION MULTIPLE LIPOMAS UPPER LIPOMAS BILATERAL ARMS;  Surgeon: David Perry MD;  Location: [facility] MAIN OR;  Service: General;  Laterality: Bilateral;    KNEE SURGERY Bilateral 2009    LIPOMA EXCISION      OTHER SURGICAL HISTORY  2010    rfa si joint lower back    SUBTOTAL HYSTERECTOMY  2007    Still have cervix    TUBAL ABDOMINAL LIGATION  1995       Home Medications:     Current Outpatient Medications:     ALPRAZolam (Xanax) 0.5 MG tablet, Take 1 tablet by mouth 2 (Two) Times a Day As Needed for Anxiety., Disp: 5 tablet, Rfl: 0    cholecalciferol (VITAMIN D3) 1.25 MG (44207 UT) capsule, cholecalciferol (vitamin D3) 1,250 mcg (50,000 unit) capsule  take one capsule by mouth once weekly, Disp: , Rfl:     DULoxetine (CYMBALTA) 60 MG capsule, Take 1 capsule by mouth Daily., Disp: 90 capsule, Rfl: 1    IODINE-VITAMIN A PO, Take 1 tablet by mouth Daily., Disp: , Rfl:     losartan-hydrochlorothiazide (HYZAAR) 50-12.5 MG per tablet, TAKE 1 TABLET DAILY, Disp: 90 tablet, Rfl: 3    multivitamin with minerals tablet tablet, Take 1 tablet by mouth Daily., Disp: , Rfl:     NON FORMULARY, BIOTE ESTROGEN/TESTERONE PELLETS TIME RELEASED, INSERTED UNDER SKIN Q3-4MONTHS, Disp: , Rfl:     Allergies:  Allergies   Allergen Reactions    Naproxen Rash       Past  "Social History:  Social History     Socioeconomic History    Marital status:    Tobacco Use    Smoking status: Former     Current packs/day: 0.00     Average packs/day: 1 pack/day for 18.0 years (18.0 ttl pk-yrs)     Types: Cigarettes     Start date: 10/20/1983     Quit date: 10/20/2001     Years since quittin.8    Smokeless tobacco: Never   Vaping Use    Vaping status: Never Used   Substance and Sexual Activity    Alcohol use: Yes     Comment: One beer every few months    Drug use: Never    Sexual activity: Defer       Past Family History:  Family History   Problem Relation Age of Onset    Anxiety disorder Mother     Arthritis Mother     Depression Mother     Heart disease Mother     Hyperlipidemia Mother     Clotting disorder Mother     Osteoporosis Mother     Rheumatologic disease Mother     Heart disease Father     Hyperlipidemia Father     Anxiety disorder Sister     Depression Sister     Alcohol abuse Brother     Drug abuse Brother     Anxiety disorder Son     Arthritis Maternal Grandmother     Cancer Maternal Grandmother     Diabetes Maternal Grandmother     Vision loss Maternal Grandmother     Malig Hyperthermia Neg Hx      No known family history of sleep apnea  Objective:        Vital Signs:   Visit Vitals  /84 (BP Location: Left arm, Patient Position: Sitting)   Pulse 61   Ht 165.1 cm (65\")   Wt 103 kg (227 lb)   SpO2 98%   BMI 37.77 kg/m²     Wt Readings from Last 3 Encounters:   24 103 kg (227 lb)   24 104 kg (229 lb)   24 102 kg (225 lb)     Neck Circumference: 14 inches    Physical Exam:   GEN:  No acute distress, alert, cooperative, well developed, obese   EYES:   Sclerae clear. No icterus. PERRL. Normal EOM  ENT:   External ears/nose normal, no oral lesions, no thrush, mucous membranes moist, Septum midline. Mallampati IV airway. Redundant membranous soft palate  NECK:  Supple, midline trachea, no JVD  LUNGS: Normal chest on inspection, CTAB, no wheezes. No " rhonchi. No crackles. Respirations regular, even and unlabored.   CV:  Regular rhythm and rate. Normal S1/S2. No murmurs, gallops, or rubs noted.  ABD:  Soft, nontender and nondistended. Normal bowel sounds. No guarding  EXT:  Moves all extremities well. No cyanosis. No redness. No edema.   Skin: Dry, intact, no bleeding      Diagnostic Data:  No  prior studies performed for sleep disorders     Assessment and Plan:       ICD-10-CM ICD-9-CM   1. Suspected sleep apnea  R29.818 781.99   2. Hypersomnia  G47.10 780.54   3. Obesity (BMI 30-39.9)  E66.9 278.00   4. Restless legs syndrome (RLS)  G25.81 333.94   5. Witnessed apneic spells  R06.81 786.03   6. Factor V Leiden  D68.51 289.81       Recommendations:     Patient is a good candidate for the home sleep study which will be ordered today  If the home sleep study is inconclusive or nondiagnostic, we will consider the in-lab sleep study  Patient is agreeable with the CPAP therapy and will be initiated accordingly      Patient was educated in depth about ALISON and cardiovascular consequences if left untreated, including but not limited to CHF, CAD, arrhythmias, CVA, and/or HTN. Education also provided about the diagnostic tools for ALISON, including the polysomnography and the treatment modalities, including the CPAP.     If patient has obstructive sleep apnea the recommend treatment is CPAP and will start CPAP and patient will follow up within 31-90 days after starting CPAP for compliance review.   Will address alternative treatment option if intolerant to CPAP     Adherence to the CPAP is a key factor in successful treatment of ALISON and the patient was encouraged to contact us in case of problem with the CPAP or the mask that can limit the tolerance of the compliance with the therapy.    Patient was educated about the impact of obesity on sleep apnea and the benefit of weight loss and weight loss was recommended    Orders Placed This Encounter   Procedures    Home Sleep  Study     No orders of the defined types were placed in this encounter.     Return in about 3 months (around 11/14/2024).    Denis Junior MD   [facility]   08/14/24  09:07 EDT    Dictated utilizing Dragon dictation

## 2024-09-16 ENCOUNTER — HOSPITAL ENCOUNTER (OUTPATIENT)
Dept: SLEEP MEDICINE | Facility: HOSPITAL | Age: 54
Discharge: HOME | End: 2024-09-16
Admitting: INTERNAL MEDICINE
Payer: OTHER GOVERNMENT

## 2024-09-16 DIAGNOSIS — E66.9 OBESITY (BMI 30-39.9): ICD-10-CM

## 2024-09-16 DIAGNOSIS — G47.10 HYPERSOMNIA: ICD-10-CM

## 2024-09-16 DIAGNOSIS — R29.818 SUSPECTED SLEEP APNEA: ICD-10-CM

## 2024-09-16 PROCEDURE — 95806 SLEEP STUDY UNATT&RESP EFFT: CPT

## 2024-09-17 NOTE — DISCHARGE INSTRUCTIONS
DISCHARGE INSTRUCTIONS  CARPAL TUNNEL RELEASE      For your surgery you had:  General anesthesia (you may have a sore throat for the first 24 hours)  IV sedation  Local anesthesia  Monitored anesthesia care  Regional Anesthesia   You may experience dizziness, drowsiness, or lightheadedness for several hours following surgery.  Do not stay alone today or tonight.  Limit your activity for 24 hours.  Resume your diet slowly.    You should not drive or operate machinery, drink alcohol, or sign legally binding documents for 24 hours or while you are taking pain medication.  If you had an axillary or regional block, you may not have control of the involved limb for up to 12 hours. Protect the arm with a sling or follow your physician's specific instructions. Carry the upper arm in a sling so that the hand and wrist are above the level of the heart. Elevate affected arm on a pillow when resting.   Use ice to affected area for 48-72 hours. Apply 20 minutes on - 20 minutes off. Do NOT apply directly to skin.  Exercise fingers frequently by making a full fist and fully straightening the fingers. This will help prevent swelling and stiffness.  Do NOT do any heavy lifting, pulling or strenuous activities using the affected hand. [x] Keep splint clean and dry.    [x] Do NOT submerge in water.  [x] Keep incision area clean and dry.  [x] You may shower or bathe in 2 DAYS, KEEP DRESSING CLEAN DRY AND INTACT - DO NOT REMOVE AT ANY TIME.    .  NOTIFY YOUR DOCTOR IF YOU EXPERIENCE ANY OF THE FOLLOWING:  Temperature greater than 101 degrees Fahrenheit  Shaking Chills  Redness or excessive drainage from incision  Nausea, vomiting and/or pain that is not controlled by prescribed medications  Increase in bleeding or bleeding that is excessive  Unable to urinate in 6 hours after surgery  If unable to reach your doctor, please go to the closest Emergency Room  Last dose of pain medication was given at: May take at anytime  .      SPECIAL  Scheduled appointment with Dee Butler NP on 10/9 10am  Patient will need to have follow up CT between 10/17- 11/14.  This will be ordered after your visit with Dee.  Please call central scheduling to arrange appointment [phone].   Right groin with dressing.  Keep dry and intact. Replace dry dressing as needed. Monitor for bleeding, oozing, swelling, and hematoma.   Right wrist with dressing post gregorio removal.  May remove dressing tonight after 5pm   OK to shower.   No powders, lotions, creams or ointments to right groin  Discussed post cardiac procedure discharge instructions with patient and will be given upon discharge by nurse.  Start Plavix 75mg and aspirin 81mg daily until instructed to discontinue   Take Colchicine 0.3mg twice daily for 5 days        Left Atrial Appendage Closure (Watchman/Amulet) Discharge Instructions    Site Care:  Dressing to groin site can be removed 24 hours after the procedure. Reapply dressing to puncture area to keep dry and moisture free   Dressing to wrist may be removed after 5pm Tuesday after the procedure  You may shower the day after procedure.   Cleanse the site gently with soap and water then pat dry. Reapply dry dressing  Do not apply lotion, ointments, creams or powders to the site.   Do not sit in the bathtub, hot tub or pool of water for 10-14 days.     Activity Restrictions:   No driving for 48 hours. After 48 hours, you may drive with caution (unless driving restricted for any other reason).   You may typically return to work in 7-10 days, as long as it does not involve heavy lifting or strenuous exertion.   For 10-14 days, we suggest:   No lifting >10lbs  Avoid pushing or pulling, lifting, running, stretching, exercise, sit ups or sexual activity  Limit your use of stairs  When you cough, sneeze or strain, hold pressure on the groin puncture site to lessen the chance of bleeding.     When to call your Doctor:   Call your healthcare provider right away, if you  INSTRUCTIONS:               I have read and received the above instructions.    have any of the following:  Redness, pain, swelling, bleeding, or drainage from your puncture site.  If bleeding arises apply pressure and call 911 or go to the Emergency Department  Chest pain, shortness of breath, or dizziness   Sudden coldness, pain, or numbness in the leg or arm with the puncture site   Nausea or vomiting   A bruise or lump at puncture site that has increased in size since discharge   Difficulty swallowing   Burning or hesitation with voiding     Go Directly to the Emergency room if:   Severe chest pain   Severe shortness of breath   Fever of 100.4°F (38.0°C) or higher.   Tell the ED staff the date of your Watchman and to contact your electrophysiologist immediately.   Neurological symptoms (Slurred speech, loss of sensation, decreased strength in hands or legs, or any other stroke-like symptoms).     Pain Management   You may take Extra Strength Tylenol unless otherwise directed by your doctor (do not exceed 4000mg per day). The pain should lessen over a couple of days.     Common Symptoms After Watchman/Amulet Device  Your puncture site may be tender or have mild bruising which will resolve over the next week or so. It is normal to have a small bruise or lump where the catheter was inserted    Follow up:  Follow up with Dee Butler NP as scheduled.

## 2024-09-24 NOTE — PRE-PROCEDURE INSTRUCTIONS
PATIENT INSTRUCTED TO BE:    - NOTHING TO EAT AFTER MIDNIGHT OR CHEW, EXCEPT CAN HAVE CLEAR LIQUIDS 2 HOURS PRIOR TO SURGERY ARRIVAL TIME , NO MORE THAN 8 OZ. (NOTHING RED)     - TO HOLD ALL VITAMINS, SUPPLEMENTS, NSAIDS FOR ONE WEEK PRIOR TO THEIR SURGICAL PROCEDURE     - INSTRUCTED PT TO USE SURGICAL SOAP 1 TIME  AM OF SURGERY ____9/25/24_________   USE THE SOAP FROM NECK TO TOES, AVOID THEIR FACE, HAIR, AND PRIVATE PARTS. IF USE THE SOAP THE NIGHT PRIOR TO SURGERY, CHANGE BED LINENS AND NO PETS IN THE BED.     INSTRUCTED NO LOTIONS, JEWELRY, PIERCINGS,  NAIL POLISH, OR DEODORANT DAY OF SURGERY  -INSTRUCTED TO TAKE THE FOLLOWING MEDICATIONS THE DAY OF SURGERY WITH SIPS OF WATER: Cymbalta     Do not take losartan-hydrochlorothiazide am of surgery     - DO NOT BRING ANY MEDICATIONS WITH YOU TO THE HOSPITAL THE DAY OF SURGERY, EXCEPT IF USE INHALERS. BRING INHALERS DAY OF SURGERY       - BRING CPAP OR BIPAP TO THE HOSPITAL ONLY IF YOU ARE SPENDING THE NIGHT    - DO NOT SMOKE OR VAPE 24 HOURS PRIOR TO PROCEDURE PER ANESTHESIA REQUEST     -MAKE SURE YOU HAVE A RIDE HOME OR SOMEONE TO STAY WITH YOU THE DAY OF THE PROCEDURE AFTER YOU GO HOME     - FOLLOW ANY OTHER INSTRUCTIONS GIVEN TO YOU BY YOUR SURGEON'S OFFICE.     - DAY OF SURGERY ___9/25/24__COME TO [facility]/ [facility], [facility] FLOOR. CHECK IN AT THE DESK FOR REGISTRATION/SURGERY    - YOU WILL RECEIVE A PHONE CALL THE DAY PRIOR TO SURGERY BETWEEN 1PM AND 4 PM WITH ARRIVAL TIME, IF YOUR SURGERY IS ON A MONDAY YOU WILL RECEIVE A CALL THE FRIDAY PRIOR TO SURGERY DATE    - BRING CASH OR CREDIT CARD FOR COPAYMENT OF MEDICATIONS AFTER SURGERY IF YOU USE THE HOSPITAL PHARMACY (MEDS TO BED)    - PREADMISSION TESTING NURSE Kelly TEAGUE [phone] IF HAVE ANY QUESTIONS     -PATIENT PROVIDED THE NUMBER FOR PREOP SURGICAL DEPT IF HAD QUESTIONS AFTER HOURS PRIOR TO SURGERY ([phone]).  INFORMED PT IF NO ANSWER, LEAVE A MESSAGE AND SOMEONE WILL RETURN THEIR CALL        PATIENT VERBALIZED UNDERSTANDING

## 2024-09-25 ENCOUNTER — ANESTHESIA EVENT (OUTPATIENT)
Dept: PERIOP | Facility: HOSPITAL | Age: 54
End: 2024-09-25
Payer: OTHER GOVERNMENT

## 2024-09-25 ENCOUNTER — HOSPITAL ENCOUNTER (OUTPATIENT)
Facility: HOSPITAL | Age: 54
Setting detail: HOSPITAL OUTPATIENT SURGERY
Discharge: HOME | End: 2024-09-25
Attending: ORTHOPAEDIC SURGERY | Admitting: ORTHOPAEDIC SURGERY
Payer: OTHER GOVERNMENT

## 2024-09-25 ENCOUNTER — ANESTHESIA (OUTPATIENT)
Dept: PERIOP | Facility: HOSPITAL | Age: 54
End: 2024-09-25
Payer: OTHER GOVERNMENT

## 2024-09-25 VITALS
RESPIRATION RATE: 16 BRPM | HEART RATE: 61 BPM | WEIGHT: 226.41 LBS | SYSTOLIC BLOOD PRESSURE: 134 MMHG | HEIGHT: 65 IN | TEMPERATURE: 97.4 F | BODY MASS INDEX: 37.72 KG/M2 | OXYGEN SATURATION: 100 % | DIASTOLIC BLOOD PRESSURE: 74 MMHG

## 2024-09-25 DIAGNOSIS — G56.01 CARPAL TUNNEL SYNDROME ON RIGHT: ICD-10-CM

## 2024-09-25 PROCEDURE — 25810000003 LACTATED RINGERS PER 1000 ML: Performed by: ANESTHESIOLOGY

## 2024-09-25 PROCEDURE — 25010000002 PROPOFOL 500 MG/50ML EMULSION

## 2024-09-25 PROCEDURE — 25010000002 MIDAZOLAM PER 1MG: Performed by: ANESTHESIOLOGY

## 2024-09-25 PROCEDURE — 25010000002 BUPIVACAINE (PF) 0.5 % SOLUTION: Performed by: ORTHOPAEDIC SURGERY

## 2024-09-25 PROCEDURE — 25010000002 ONDANSETRON PER 1 MG

## 2024-09-25 PROCEDURE — 25010000002 CEFAZOLIN PER 500 MG: Performed by: ORTHOPAEDIC SURGERY

## 2024-09-25 PROCEDURE — 25010000002 DEXAMETHASONE PER 1 MG

## 2024-09-25 PROCEDURE — 64721 CARPAL TUNNEL SURGERY: CPT | Performed by: ORTHOPAEDIC SURGERY

## 2024-09-25 PROCEDURE — 25010000002 PROPOFOL 10 MG/ML EMULSION

## 2024-09-25 RX ORDER — OXYCODONE HYDROCHLORIDE 5 MG/1
5 TABLET ORAL
Status: DISCONTINUED | OUTPATIENT
Start: 2024-09-25 | End: 2024-09-25 | Stop reason: HOSPADM

## 2024-09-25 RX ORDER — KETAMINE HCL IN NACL, ISO-OSM 100MG/10ML
SYRINGE (ML) INJECTION AS NEEDED
Status: DISCONTINUED | OUTPATIENT
Start: 2024-09-25 | End: 2024-09-25 | Stop reason: SURG

## 2024-09-25 RX ORDER — MIDAZOLAM HYDROCHLORIDE 2 MG/2ML
2 INJECTION, SOLUTION INTRAMUSCULAR; INTRAVENOUS ONCE
Status: COMPLETED | OUTPATIENT
Start: 2024-09-25 | End: 2024-09-25

## 2024-09-25 RX ORDER — ONDANSETRON 2 MG/ML
4 INJECTION INTRAMUSCULAR; INTRAVENOUS ONCE AS NEEDED
Status: DISCONTINUED | OUTPATIENT
Start: 2024-09-25 | End: 2024-09-25 | Stop reason: HOSPADM

## 2024-09-25 RX ORDER — HYDROCODONE BITARTRATE AND ACETAMINOPHEN 7.5; 325 MG/1; MG/1
1 TABLET ORAL EVERY 4 HOURS PRN
Qty: 21 TABLET | Refills: 0 | Status: SHIPPED | OUTPATIENT
Start: 2024-09-25

## 2024-09-25 RX ORDER — PROPOFOL 10 MG/ML
INJECTION, EMULSION INTRAVENOUS AS NEEDED
Status: DISCONTINUED | OUTPATIENT
Start: 2024-09-25 | End: 2024-09-25 | Stop reason: SURG

## 2024-09-25 RX ORDER — HYDROCODONE BITARTRATE AND ACETAMINOPHEN 7.5; 325 MG/1; MG/1
1 TABLET ORAL ONCE AS NEEDED
Status: DISCONTINUED | OUTPATIENT
Start: 2024-09-25 | End: 2024-09-25 | Stop reason: HOSPADM

## 2024-09-25 RX ORDER — ACETAMINOPHEN 500 MG
1000 TABLET ORAL ONCE
Status: COMPLETED | OUTPATIENT
Start: 2024-09-25 | End: 2024-09-25

## 2024-09-25 RX ORDER — DEXAMETHASONE SODIUM PHOSPHATE 4 MG/ML
INJECTION, SOLUTION INTRA-ARTICULAR; INTRALESIONAL; INTRAMUSCULAR; INTRAVENOUS; SOFT TISSUE AS NEEDED
Status: DISCONTINUED | OUTPATIENT
Start: 2024-09-25 | End: 2024-09-25 | Stop reason: SURG

## 2024-09-25 RX ORDER — BUPIVACAINE HYDROCHLORIDE 5 MG/ML
INJECTION, SOLUTION EPIDURAL; INTRACAUDAL AS NEEDED
Status: DISCONTINUED | OUTPATIENT
Start: 2024-09-25 | End: 2024-09-25 | Stop reason: HOSPADM

## 2024-09-25 RX ORDER — LIDOCAINE HYDROCHLORIDE 20 MG/ML
INJECTION, SOLUTION EPIDURAL; INFILTRATION; INTRACAUDAL; PERINEURAL AS NEEDED
Status: DISCONTINUED | OUTPATIENT
Start: 2024-09-25 | End: 2024-09-25 | Stop reason: SURG

## 2024-09-25 RX ORDER — ONDANSETRON 2 MG/ML
INJECTION INTRAMUSCULAR; INTRAVENOUS AS NEEDED
Status: DISCONTINUED | OUTPATIENT
Start: 2024-09-25 | End: 2024-09-25 | Stop reason: SURG

## 2024-09-25 RX ORDER — SODIUM CHLORIDE, SODIUM LACTATE, POTASSIUM CHLORIDE, CALCIUM CHLORIDE 600; 310; 30; 20 MG/100ML; MG/100ML; MG/100ML; MG/100ML
9 INJECTION, SOLUTION INTRAVENOUS CONTINUOUS PRN
Status: DISCONTINUED | OUTPATIENT
Start: 2024-09-25 | End: 2024-09-25 | Stop reason: HOSPADM

## 2024-09-25 RX ADMIN — ONDANSETRON 4 MG: 2 INJECTION INTRAMUSCULAR; INTRAVENOUS at 07:33

## 2024-09-25 RX ADMIN — PROPOFOL 150 MCG/KG/MIN: 10 INJECTION, EMULSION INTRAVENOUS at 07:33

## 2024-09-25 RX ADMIN — Medication 10 MG: at 07:51

## 2024-09-25 RX ADMIN — DEXAMETHASONE SODIUM PHOSPHATE 4 MG: 4 INJECTION, SOLUTION INTRAMUSCULAR; INTRAVENOUS at 07:35

## 2024-09-25 RX ADMIN — Medication 20 MG: at 07:33

## 2024-09-25 RX ADMIN — PROPOFOL 50 MG: 10 INJECTION, EMULSION INTRAVENOUS at 07:48

## 2024-09-25 RX ADMIN — MIDAZOLAM HYDROCHLORIDE 2 MG: 1 INJECTION, SOLUTION INTRAMUSCULAR; INTRAVENOUS at 07:23

## 2024-09-25 RX ADMIN — SODIUM CHLORIDE, POTASSIUM CHLORIDE, SODIUM LACTATE AND CALCIUM CHLORIDE 9 ML/HR: 600; 310; 30; 20 INJECTION, SOLUTION INTRAVENOUS at 06:49

## 2024-09-25 RX ADMIN — SODIUM CHLORIDE 2 G: 9 INJECTION, SOLUTION INTRAVENOUS at 07:33

## 2024-09-25 RX ADMIN — OXYCODONE HYDROCHLORIDE 5 MG: 5 TABLET ORAL at 08:23

## 2024-09-25 RX ADMIN — PROPOFOL 50 MG: 10 INJECTION, EMULSION INTRAVENOUS at 07:33

## 2024-09-25 RX ADMIN — ACETAMINOPHEN 1000 MG: 500 TABLET ORAL at 06:49

## 2024-09-25 RX ADMIN — LIDOCAINE HYDROCHLORIDE 100 MG: 20 INJECTION, SOLUTION EPIDURAL; INFILTRATION; INTRACAUDAL; PERINEURAL at 07:33

## 2024-09-25 NOTE — DISCHARGE INSTRUCTIONS
DISCHARGE INSTRUCTIONS  CARPAL TUNNEL RELEASE      For your surgery you had:  General anesthesia (you may have a sore throat for the first 24 hours)  IV sedation  Local anesthesia  Monitored anesthesia care  Regional Anesthesia    You may experience dizziness, drowsiness, or lightheadedness for several hours following surgery.  Do not stay alone today or tonight.  Limit your activity for 24 hours.  Resume your diet slowly.    You should not drive or operate machinery, drink alcohol, or sign legally binding documents for 24 hours or while you are taking pain medication.   Elevate affected arm on a pillow when resting.   Use ice to affected area for 48-72 hours. Apply 20 minutes on - 20 minutes off. Do NOT apply directly to skin.  Exercise fingers frequently by making a full fist and fully straightening the fingers. This will help prevent swelling and stiffness.  Do NOT do any heavy lifting, pulling or strenuous activities using the affected hand until follow-up appt. [x] Keep splint clean and dry.  [x] Do NOT submerge in water.  [x] Keep incision area clean and dry.  [x] You may shower or bathe in FRIDAY..  NOTIFY YOUR DOCTOR IF YOU EXPERIENCE ANY OF THE FOLLOWING:  Temperature greater than 101 degrees Fahrenheit  Shaking Chills  Redness or excessive drainage from incision  Nausea, vomiting and/or pain that is not controlled by prescribed medications  Increase in bleeding or bleeding that is excessive  Unable to urinate in 6 hours after surgery  If unable to reach your doctor, please go to the closest Emergency Room  SPECIAL INSTRUCTIONS:

## 2024-09-25 NOTE — OP NOTE
CARPAL TUNNEL RELEASE  Procedure Report    Patient Name:  Tiffanie Peres  YOB: 1970    Date of Surgery:  9/25/2024     Indications:  +CTS    Pre-op Diagnosis:   Carpal tunnel syndrome on right [G56.01]       Post-Op Diagnosis Codes:     * Carpal tunnel syndrome on right [G56.01]    Procedure/CPT® Codes:      Procedure(s):  CARPAL TUNNEL RELEASE RIGHT    Staff:  Surgeon(s):  Dereje Tolbert MD    Assistant: Stanford Aleman    Anesthesia: General    Estimated Blood Loss: minimal    Implants:    Nothing was implanted during the procedure    Specimen:          None        Findings: Carpal Tunnel Syndrome     Complications: None    Description of Procedure: The patient was brought to the operating room and underwent anesthesia without complication.  The patient received preoperative antibiotic and was then prepped and draped in sterile fashion.  Incision was made directly over the transverse carpal ligament and dissected down.  The ligament was identified and then opened using the #15 blade and then completed using tenotomy scissors.  Care was taken to avoid any neurovascular or tendinous structures and then checked with a [place] and a good release was achieved.  This was then thoroughly irrigated and closed using 4-0 nylon.  Half percent plain Marcaine was injected around the surgical site.  A sterile dressing was applied, followed by application of a splint.  The tourniquet was then deflated.  The patient was taken to the recovery room in stable condition.  There were no complications.    Assistant: Stanford Aleman  was responsible for performing the following activities: Retraction, Suction, Irrigation, Closing, and Placing Dressing and their skilled assistance was necessary for the success of this case.    Dereje Tolbert MD     Date: 9/25/2024  Time: 08:04 EDT

## 2024-09-25 NOTE — H&P
"Chief Complaint  No chief complaint on file.     Subjective      Tiffanie Peres presents to [facility] OR for follow-up of right carpal tunnel release performed on 2023. She is still having continued numbness and tingling.  She was diagnosed in  with carpal tunnel and still having symptoms.  She feels like she is getting \"shocked.\"  She feels continued pins and needles always in the thumb and the 2 nd and 3 rd digit.  She drops things and has decrease FMC strength.  She notes the numbness and tingling wake her up at night.     Allergies   Allergen Reactions    Naproxen Rash        Social History     Socioeconomic History    Marital status:    Tobacco Use    Smoking status: Former     Current packs/day: 0.00     Average packs/day: 1 pack/day for 18.0 years (18.0 ttl pk-yrs)     Types: Cigarettes     Start date: 10/20/1983     Quit date: 10/20/2001     Years since quittin.9    Smokeless tobacco: Never   Vaping Use    Vaping status: Never Used   Substance and Sexual Activity    Alcohol use: Yes     Comment: One beer every few months    Drug use: Never    Sexual activity: Defer        I reviewed the patient's chief complaint, history of present illness, review of systems, past medical history, surgical history, family history, social history, medications, and allergy list.     Review of Systems     Constitutional: Denies fevers, chills, weight loss  Cardiovascular: Denies chest pain, shortness of breath  Skin: Denies rashes, acute skin changes  Neurologic: Denies headache, loss of consciousness  MSK: Right hand pain.       Vital Signs:   /87 (BP Location: Left arm, Patient Position: Sitting)   Pulse 66   Temp 98.6 °F (37 °C) (Temporal)   Resp 20   Ht 165.1 cm (65\")   Wt 103 kg (226 lb 6.6 oz)   SpO2 98%   BMI 37.68 kg/m²          Physical Exam  General: Alert. No acute distress    Ortho Exam        RIGHT HAND Positive Compression testing/ Positive Tinels. " NegativeFinkelsteins. Negative Macias's testing. Negative CMC grind testing. Positive Phalens. Full ROM of the hand, fingers, elbow and wrist. Negative Triggering of the digit. Sensation grossly intact to light touch, median, radial and ulnar nerve. Positive AIN, PIN and ulnar nerve motor function intact. Axillary nerve intact. Positive pulses.        Procedures      Imaging Results (Most Recent)       None             Result Review :          Assessment and Plan     Diagnoses and all orders for this visit:    1. Carpal tunnel syndrome on right  -     ceFAZolin 2000 mg IVPB in 100 mL NS (VTB)  -     Discontinue: ceFAZolin 3000 mg IVPB in 100 mL NS (VTB)    Other orders  -     Follow Anesthesia Guidelines / Protocol; Standing  -     Nerve Block; Standing  -     Verify NPO Status; Standing  -     SCD (Sequential Compression Device) Place on Patient in Pre-Op; Standing  -     POC Glucose Once; Standing  -     Clip Operative Site; Standing  -     Obtain Informed Consent (If Not Done Inpatient or PAT); Standing  -     Instructions on coughing, deep breathing, and incentive spirometry.; Standing  -     Follow Anesthesia Guidelines / Protocol  -     Nerve Block  -     Verify NPO Status  -     SCD (Sequential Compression Device) Place on Patient in Pre-Op  -     POC Glucose Once  -     Clip Operative Site  -     Obtain Informed Consent (If Not Done Inpatient or PAT)  -     Instructions on coughing, deep breathing, and incentive spirometry.  -     Instructions on coughing, deep breathing, and incentive spirometry.  -     Instructions on coughing, deep breathing, and incentive spirometry.  -     Instructions on coughing, deep breathing, and incentive spirometry.  -     Instructions on coughing, deep breathing, and incentive spirometry.        Discussed the treatment plan with the patient.     Discussed the treatment options with the patient, operative vs non-operative.   The patient expressed understanding and wished to  proceed with a right carpal tunnel release.       Discussed surgery., Risks/benefits discussed with patient including, but not limited to: infection, bleeding, neurovascular damage, re-rupture, aesthetic deformity, need for further surgery, and death., Surgery pamphlet given., and Call or return if worsening symptoms.    Follow Up     2 weeks postoperatively       I have personally performed the services described in this document as scribed by the above individual and it is both accurate and complete. Dereje Tolbert MD 09/25/24

## 2024-09-29 DIAGNOSIS — G47.33 OSA (OBSTRUCTIVE SLEEP APNEA): Primary | ICD-10-CM

## 2024-09-29 DIAGNOSIS — G47.34 SLEEP RELATED HYPOXIA: ICD-10-CM

## 2024-10-10 ENCOUNTER — OFFICE VISIT (OUTPATIENT)
Dept: ORTHOPEDIC SURGERY | Facility: CLINIC | Age: 54
End: 2024-10-10
Payer: OTHER GOVERNMENT

## 2024-10-10 VITALS
DIASTOLIC BLOOD PRESSURE: 93 MMHG | OXYGEN SATURATION: 95 % | HEIGHT: 65 IN | WEIGHT: 227.07 LBS | SYSTOLIC BLOOD PRESSURE: 157 MMHG | BODY MASS INDEX: 37.83 KG/M2 | HEART RATE: 71 BPM

## 2024-10-10 DIAGNOSIS — Z98.890 S/P CARPAL TUNNEL RELEASE: ICD-10-CM

## 2024-10-10 DIAGNOSIS — M79.641 RIGHT HAND PAIN: Primary | ICD-10-CM

## 2024-10-10 PROCEDURE — 99024 POSTOP FOLLOW-UP VISIT: CPT

## 2024-10-10 NOTE — PROGRESS NOTES
"Chief Complaint  Pain and Follow-up of the Right Wrist and Suture / Staple Removal    Subjective      Tiffanie Peres presents to [facility] for follow up of her right hand.  Patient is 2 weeks status post right carpal tunnel release performed Dr. Tolbert on 9/25/2024.  Patient arrives today with splint intact. Patient states that she still has some numbness in her thumb and first finger.  Patient states that this is her second carpal tunnel release on the right hand.  She states that things are better    Allergies   Allergen Reactions    Naproxen Rash       Objective     Vital Signs:   Vitals:    10/10/24 0949   BP: 157/93   Pulse: 71   SpO2: 95%   Weight: 103 kg (227 lb 1.2 oz)   Height: 165.1 cm (65\")     Body mass index is 37.79 kg/m².    I reviewed the patient's chief complaint, history of present illness, review of systems, past medical history, surgical history, family history, social history, medications, and allergy list.     REVIEW OF SYSTEMS    Constitutional: Denies fevers, chills, weight loss  Cardiovascular: Denies chest pain, shortness of breath  Skin: No pain rashes, acute skin changes  Neurologic: Denies headache, loss of consciousness  MSK: Right hand pain.     Ortho Exam  Carpal Tunnel Release   General: Alert. No acute distress.  Right upper extremity: Splint removed.  Sutures removed today without complications.  Well-healing incision about the palm.  No active drainage or redness noted. No concerning signs of infection. Full elbow range of motion. Finger range of motion intact. No pain with finger range of motion. Demonstrates active wrist flexion and extension with associated stiffness. Thumb opposition intact. Palmar abduction of the thumb intact.  Sensation intact. Neurovascularly intact. Palpable radial pulse.            Imaging Results (Most Recent)       None                Assessment and Plan   Diagnoses and all orders for this visit:    1. Right hand pain " (Primary)    2. S/P carpal tunnel release         Tiffanie Peres presents today 2 weeks post op right carpal tunnel release performed by Dr. Tolbert on 9/25/2024. Sutures removed today in office without complications. No active drainage or redness noted. No concerning signs of infection.  Incision site care was reviewed today. Patient instructed not to soak or submerge incision. Do not apply any lotions, creams, or ointments to the incision at this time.  We discussed concerning signs and symptoms regarding the incision site.  Patient understood and agreed.  Continue icing and elevation as needed help with pain and swelling.     Patient advised on return to carpal tunnel braces nightly and with activity.  Okay for patient to apply gauze over the incision while wearing the wrist brace to avoid irritation.   Carpal tunnel brace provided in office today.  Discussed avoiding repetitive range of motion of the hand or wrist.  No heavy lifting, pushing or pulling until incision is fully healed.  Home exercises were provided and demonstrated in office today. Discussed the importance of these exercises, including working on making a tight fist. We discussed ordering formal occupational therapy at the next office visit if needed..       Patient will follow up in 4 weeks for reevaluation.  No imaging needed.    Call or return if symptoms worsen or patient has any concerns.          Tobacco Use: Medium Risk (10/10/2024)    Patient History     Smoking Tobacco Use: Former     Smokeless Tobacco Use: Never     Passive Exposure: Not on file     Patient reports they have a history of tobacco use; encouraged continued tobacco cessation for further health benefits.            Follow Up   Return in about 1 month (around 11/10/2024).  There are no Patient Instructions on file for this visit.  Patient was given instructions and counseling regarding her condition or for health maintenance advice. Please see specific information pulled  into the AVS if appropriate.       Dictated Utilizing Dragon Dictation. Please note that portions of this note were completed with a voice recognition program. Part of this note may be an electronic transcription/translation of spoken language to printed text using the Dragon Dictation System.

## 2024-10-16 ENCOUNTER — TELEPHONE (OUTPATIENT)
Dept: SLEEP MEDICINE | Facility: HOSPITAL | Age: 54
End: 2024-10-16
Payer: OTHER GOVERNMENT

## 2024-12-11 ENCOUNTER — OFFICE VISIT (OUTPATIENT)
Dept: FAMILY MEDICINE CLINIC | Facility: CLINIC | Age: 54
End: 2024-12-11
Payer: OTHER GOVERNMENT

## 2024-12-11 VITALS
BODY MASS INDEX: 39.65 KG/M2 | HEART RATE: 76 BPM | RESPIRATION RATE: 16 BRPM | SYSTOLIC BLOOD PRESSURE: 134 MMHG | WEIGHT: 238 LBS | TEMPERATURE: 96.2 F | OXYGEN SATURATION: 96 % | DIASTOLIC BLOOD PRESSURE: 72 MMHG | HEIGHT: 65 IN

## 2024-12-11 DIAGNOSIS — Z12.4 CERVICAL CANCER SCREENING: ICD-10-CM

## 2024-12-11 DIAGNOSIS — Z00.00 ANNUAL PHYSICAL EXAM: Primary | ICD-10-CM

## 2024-12-11 PROCEDURE — 99396 PREV VISIT EST AGE 40-64: CPT | Performed by: STUDENT IN AN ORGANIZED HEALTH CARE EDUCATION/TRAINING PROGRAM

## 2024-12-11 PROCEDURE — G0123 SCREEN CERV/VAG THIN LAYER: HCPCS | Performed by: STUDENT IN AN ORGANIZED HEALTH CARE EDUCATION/TRAINING PROGRAM

## 2024-12-11 PROCEDURE — 87624 HPV HI-RISK TYP POOLED RSLT: CPT | Performed by: STUDENT IN AN ORGANIZED HEALTH CARE EDUCATION/TRAINING PROGRAM

## 2024-12-11 NOTE — PROGRESS NOTES
"Chief Complaint  Annual Exam and Gynecologic Exam    Subjective         Tiffanie Peres is a 54 y.o. female who presents to [facility]    54 years old comes to the clinic today for annual physical.    Patient had mammogram done recently with no abnormal findings.    Currently reporting no complaints.    Patient would like to get Pap smear done, no vaginal symptoms.  Patient still has cervix, had partial hysterectomy.    12+ review of systems are unremarkable otherwise    Aware of the pending blood work    Objective   Vital Signs:   Vitals:    12/11/24 0748   BP: 134/72   BP Location: Left arm   Patient Position: Sitting   Cuff Size: Large Adult   Pulse: 76   Resp: 16   Temp: 96.2 °F (35.7 °C)   TempSrc: Temporal   SpO2: 96%   Weight: 108 kg (238 lb)   Height: 165.1 cm (65\")   PainSc:   4      Body mass index is 39.61 kg/m².   Wt Readings from Last 3 Encounters:   12/11/24 108 kg (238 lb)   10/10/24 103 kg (227 lb 1.2 oz)   09/25/24 103 kg (226 lb 6.6 oz)      BP Readings from Last 3 Encounters:   12/11/24 134/72   10/10/24 157/93   09/25/24 134/74        Patient Care Team:  Zo Vance MD as PCP - General (Family Medicine)  David Perry MD as Consulting Physician (General Surgery)     Physical Exam  Exam conducted with a chaperone present.   Chest:      Chest wall: No mass, swelling or tenderness.   Breasts:     Right: Normal. No inverted nipple or nipple discharge.      Left: Normal. No inverted nipple or nipple discharge.   Abdominal:      Hernia: There is no hernia in the left inguinal area or right inguinal area.   Genitourinary:     General: Normal vulva.      Exam position: Knee-chest position.      Pubic Area: No rash.       Labia:         Right: No rash.         Left: No rash.       Urethra: No prolapse or urethral pain.      Vagina: Normal.      Cervix: Normal.                          Assessment and Plan   Diagnoses and all orders for this visit:    1. Annual " physical exam (Primary)  Comments:  Lifestyle modifications discussed,    2. Cervical cancer screening  -     IGP, Aptima HPV, Rfx 16 / 18,45; Future  -     IGP, Aptima HPV, Rfx 16 / 18,45          Tobacco Use: Medium Risk (12/11/2024)    Patient History    • Smoking Tobacco Use: Former    • Smokeless Tobacco Use: Never    • Passive Exposure: Not on file            Follow Up   Return in about 3 months (around 3/11/2025) for Next scheduled follow up.  Patient was given instructions and counseling regarding her condition or for health maintenance advice. Please see specific information pulled into the AVS if appropriate.

## 2024-12-13 DIAGNOSIS — I10 PRIMARY HYPERTENSION: ICD-10-CM

## 2024-12-14 RX ORDER — LOSARTAN POTASSIUM AND HYDROCHLOROTHIAZIDE 12.5; 5 MG/1; MG/1
1 TABLET ORAL DAILY
Qty: 90 TABLET | Refills: 3 | Status: SHIPPED | OUTPATIENT
Start: 2024-12-14

## 2024-12-19 LAB
CYTOLOGIST CVX/VAG CYTO: NORMAL
CYTOLOGY CVX/VAG DOC CYTO: NORMAL
CYTOLOGY CVX/VAG DOC THIN PREP: NORMAL
DX ICD CODE: NORMAL
HPV GENOTYPE REFLEX: NORMAL
HPV I/H RISK 4 DNA CVX QL PROBE+SIG AMP: NEGATIVE
Lab: NORMAL
Lab: NORMAL
OTHER STN SPEC: NORMAL
STAT OF ADQ CVX/VAG CYTO-IMP: NORMAL

## 2025-01-06 DIAGNOSIS — F32.A ANXIETY AND DEPRESSION: ICD-10-CM

## 2025-01-06 DIAGNOSIS — F41.9 ANXIETY AND DEPRESSION: ICD-10-CM

## 2025-01-07 RX ORDER — DULOXETIN HYDROCHLORIDE 60 MG/1
60 CAPSULE, DELAYED RELEASE ORAL DAILY
Qty: 90 CAPSULE | Refills: 1 | Status: SHIPPED | OUTPATIENT
Start: 2025-01-07

## 2025-01-30 DIAGNOSIS — F41.9 ANXIETY AND DEPRESSION: ICD-10-CM

## 2025-01-30 DIAGNOSIS — F32.A ANXIETY AND DEPRESSION: ICD-10-CM

## 2025-01-30 RX ORDER — DULOXETIN HYDROCHLORIDE 60 MG/1
60 CAPSULE, DELAYED RELEASE ORAL DAILY
Qty: 90 CAPSULE | Refills: 1 | Status: SHIPPED | OUTPATIENT
Start: 2025-01-30

## 2025-05-09 ENCOUNTER — PATIENT MESSAGE (OUTPATIENT)
Dept: FAMILY MEDICINE CLINIC | Facility: CLINIC | Age: 55
End: 2025-05-09
Payer: OTHER GOVERNMENT

## 2025-05-09 DIAGNOSIS — F41.9 ANXIETY AND DEPRESSION: ICD-10-CM

## 2025-05-09 DIAGNOSIS — F32.A ANXIETY AND DEPRESSION: ICD-10-CM

## 2025-05-09 RX ORDER — DULOXETIN HYDROCHLORIDE 60 MG/1
60 CAPSULE, DELAYED RELEASE ORAL DAILY
Qty: 90 CAPSULE | Refills: 1 | Status: SHIPPED | OUTPATIENT
Start: 2025-05-09

## 2025-07-28 DIAGNOSIS — F41.9 ANXIETY AND DEPRESSION: ICD-10-CM

## 2025-07-28 DIAGNOSIS — F32.A ANXIETY AND DEPRESSION: ICD-10-CM

## 2025-07-28 RX ORDER — DULOXETIN HYDROCHLORIDE 60 MG/1
60 CAPSULE, DELAYED RELEASE ORAL DAILY
Qty: 90 CAPSULE | Refills: 1 | Status: SHIPPED | OUTPATIENT
Start: 2025-07-28

## (undated) DEVICE — DISPOSABLE TOURNIQUET CUFF SINGLE BLADDER, SINGLE PORT AND QUICK CONNECT CONNECTOR: Brand: COLOR CUFF

## (undated) DEVICE — Device: Brand: DEFENDO AIR/WATER/SUCTION AND BIOPSY VALVE

## (undated) DEVICE — Device

## (undated) DEVICE — BNDG ESMARK 4IN 12FT LF STRL BLU

## (undated) DEVICE — INTENDED FOR TISSUE SEPARATION, AND OTHER PROCEDURES THAT REQUIRE A SHARP SURGICAL BLADE TO PUNCTURE OR CUT.: Brand: BARD-PARKER ® CARBON RIB-BACK BLADES

## (undated) DEVICE — CONN JET HYDRA H20 AUXILIARY DISP

## (undated) DEVICE — UNDERCAST PADDING: Brand: DEROYAL

## (undated) DEVICE — GLV SURG BIOGEL LTX PF 8 1/2

## (undated) DEVICE — MAJOR-LF: Brand: MEDLINE INDUSTRIES, INC.

## (undated) DEVICE — NDL HYPO ECLPS SFTY 22G 1IN

## (undated) DEVICE — GAUZE,SPONGE,4"X4",16PLY,STRL,LF,10/TRAY: Brand: MEDLINE

## (undated) DEVICE — SOLIDIFIER LIQLOC PLS 1500CC BT

## (undated) DEVICE — GLOVE,SURG,SENSICARE SLT,LF,PF,7: Brand: MEDLINE

## (undated) DEVICE — STERILE POLYISOPRENE POWDER-FREE SURGICAL GLOVES WITH EMOLLIENT COATING: Brand: PROTEXIS

## (undated) DEVICE — VAGINAL PREP TRAY: Brand: MEDLINE INDUSTRIES, INC.

## (undated) DEVICE — GLV SURG SENSICARE SLT PF LF 6.5 STRL

## (undated) DEVICE — SOL IRRG H2O PL/BG 1000ML STRL

## (undated) DEVICE — SINGLE-USE BIOPSY FORCEPS: Brand: RADIAL JAW 4

## (undated) DEVICE — EXTREMITY-LF: Brand: MEDLINE INDUSTRIES, INC.

## (undated) DEVICE — GLV SURG SENSICARE SLT PF LF 8.5 STRL

## (undated) DEVICE — BNDG ELAS ECON W/CLIP 4IN 5YD LF STRL

## (undated) DEVICE — ADHS SKIN SURG TISS VISC PREMIERPRO EXOFIN HI/VISC FAST/DRY

## (undated) DEVICE — GLV SURG SENSICARE PI PF LF 7 GRN STRL

## (undated) DEVICE — SOL IRR NACL 0.9PCT BT 1000ML

## (undated) DEVICE — SUT ETHLN 4/0 FS2 18IN 662H

## (undated) DEVICE — GOWN,REINFRCE,POLY,SIRUS,BREATH SLV,XXLG: Brand: MEDLINE

## (undated) DEVICE — LINER SURG CANSTR SXN S/RIGD 1500CC

## (undated) DEVICE — GLV SURG SENSICARE SLT PF LF 7.5 STRL

## (undated) DEVICE — DRSNG WND GZ CURAD OIL EMULSION 3X3IN STRL

## (undated) DEVICE — SUT VIC 3/0 SH 27IN J416H

## (undated) DEVICE — GLOVE,SURG,SENSICARE SLT,LF,PF,8.5: Brand: MEDLINE

## (undated) DEVICE — SNAR POLYP CAPTIFLEX XS/OVL 11X2.4MM 240CM 1P/U

## (undated) DEVICE — GLV SURG SENSICARE SLT PF LF 7 STRL

## (undated) DEVICE — PENCL E/S SMOKEEVAC W/TELESCP CANN

## (undated) DEVICE — ANTIBACTERIAL UNDYED BRAIDED (POLYGLACTIN 910), SYNTHETIC ABSORBABLE SUTURE: Brand: COATED VICRYL